# Patient Record
Sex: MALE | Race: WHITE | Employment: UNEMPLOYED | URBAN - METROPOLITAN AREA
[De-identification: names, ages, dates, MRNs, and addresses within clinical notes are randomized per-mention and may not be internally consistent; named-entity substitution may affect disease eponyms.]

---

## 2021-03-12 NOTE — PROGRESS NOTES
3/15/2021  Patient Name: Adalberto Novak  : 1970  Medical Record: <[id number]>      ASSESSMENT AND PLAN    Assessment/Plan:      ASSESSMENT:    1. Multiple joint pain    2. Rheumatoid factor positive        PLAN:     Chaya Pan was seen today for establish care. Diagnoses and all orders for this visit:    Multiple joint pain  -     Miscellaneous Sendout 1; Future  -     Rheumatoid Factor; Future  -     Sedimentation Rate; Future  -     C-Reactive Protein; Future  -     Cyclic Citrul Peptide Antibody, IgG; Future  -     TSH WITH REFLEX TO FT4; Future  -     XR HAND RIGHT (MIN 3 VIEWS); Future  -     XR HAND LEFT (MIN 3 VIEWS); Future  -     XR SHOULDER RIGHT (MIN 2 VIEWS); Future  -     XR SHOULDER LEFT (MIN 2 VIEWS); Future  -     XR KNEE RIGHT (3 VIEWS); Future  -     XR KNEE LEFT (3 VIEWS); Future  -     CBC Auto Differential; Future  -     Comprehensive Metabolic Panel; Future  -     naproxen (NAPROSYN) 500 MG tablet; Take 1 tablet by mouth 2 times daily (with meals)    Rheumatoid factor positive  -     Electrophoresis Protein, Serum without Reflex to Immunofixation; Future  -     Anti SSA; Future  -     Anti SSB; Future  -     Hepatitis B Core Antibody, IgM; Future  -     Hepatitis B Surface Antigen; Future  -     Hepatitis C Antibody; Future    Most likely osteoarthritis. I do not appreciate any synovitis on joint exam.  Slightly elevated rheumatoid factor [15.2] can be seen in 3 to 25 % of normal population without any rheumatoid arthritis. Positive hepatitis C can also cause elevated rheumatoid factor.   However I cannot completely rule out the possibility of rheumatoid arthritis since he has been on prednisone for past 2 weeks which could be masking inflammation    I will do additional work-up to completely rule out rheumatoid arthritis/systemic rheumatic disease   I will also do additional work-up to completely rule out conditions associated with elevated rheumatoid factor [hepatitis panel, SPEP, ABI, SSA/SSB]  I will obtain bilateral hand, knee, shoulder x-rays  I will start naproxen 500 mg twice a day in the meantime  Further management pending test results    The patient was advised that NSAID-type medications have two very important potential side effects: gastrointestinal irritation including hemorrhage and renal injuries. He was asked to take the medication with food and to stop if he experiences any GI upset. I asked him to call for vomiting, abdominal pain or black/bloody stools. The patient expresses understanding of these issues and questions were answered. The patient indicates understanding of these issues and agrees with the plan. Return in about 3 weeks (around 4/5/2021). The risks and benefits of my recommendations, as well as other treatment options, benefits and side effects werediscussed with the patient. All questions were answered. I reviewed patient's history, referral documents and electronic medical records  Copy of consult note is being routedelectronically/faxed to referring physician         MEDICATIONS  Current Outpatient Medications   Medication Sig Dispense Refill    predniSONE (DELTASONE) 10 MG tablet       naproxen (NAPROSYN) 500 MG tablet Take 1 tablet by mouth 2 times daily (with meals) 60 tablet 1    albuterol sulfate  (90 BASE) MCG/ACT inhaler Inhale 2 puffs into the lungs every 6 hours as needed for Wheezing 1 Inhaler 0     No current facility-administered medications for this visit. ALLERGIES  Allergies   Allergen Reactions    Morphine     Pcn [Penicillins]     Ultram [Tramadol]          Comments  No specialty comments available. REFERRING PHYSICIAN:Son Resendiz PA-C    HISTORY OF PRESENT ILLNESS  Karyna Gibson is a 52 y.o. male with past medical history of asthma, COPD who is being seen for follow up evaluation of  joint pain. His symptoms started 1-1/2-year ago and are progressively getting worse.   He is complaining of pain in all the joints. Symptoms are worse in his shoulders and knees. He has been on daily basis without any significant living or aggravating factors. He denies any swelling in the joints. He has a morning stiffness lasting for 15 to 20 minutes. He has difficulty opening jars. He also has difficulty going up and down the steps and standing from sitting position. Joints crack and pop. He was given prednisone course 3 times with some relief. He has also tried NSAIDs [ibuprofen, over-the-counter Aleve and Advil] with some relief. He denies psoriasis, inflammatory bowel disease, inflammatory back pain, dactylitis, enthesitis, tenosynovitis or uveitis. He denies any family history of rheumatoid arthritis. He denies fever, weight loss or swollen glands. He had a work-up by PCP that showed slightly elevated rheumatoid factor 15.2, normal ESR and CRP and ABI. Uric acid normal.    HPI  Review of Systems    REVIEW OF SYSTEMS: Positive for dry mouth, chest pain  Constitutional: No unanticipated weight loss or fevers. No fatigue and malaise. Integumentary: No rash, photosensitivity, malar rash, livedo reticularis, alopecia and Raynaud's symptoms, sclerodactyly, skin tightening  Eyes: negative for visual disturbance and persistent redness, discharge from eyes   ENT: - No tinnitus, loss of hearing, vertigo, or recurrent ear infections.  - No history of nasal/oral ulcers. - No history of dry eyes  Cardiovascular: No history of pericarditis,  murmur or palpitations  Respiratory: No shortness of breath, cough or history of interstitial lung disease. No history of pleurisy. No history of tuberculosis or atypical infections. Gastrointestinal: No history of heart burn, dysphagia or esophageal dysmotility. No change in bowel habits or any inflammatory bowel disease. Genitourinary: No history renal disease, miscarriages. Hematologic/Lymphatic: No abnormal bruising or bleeding, blood clots or swollen lymph nodes.   Neurological: No history of headaches, seizure or focal weakness. No history of neuropathies, paresthesias or hyperesthesias, facial droop, diplopia  Psychiatric: No history of bipolar disease, anxiety, depression  Endocrine: Denies any polyuria, polydipsia and osteoporosis  Allergic/Immunologic: No nasal congestion or hives. I have reviewed patients Past medical History, Social History and Family History as mentioned in her chart and this remains unchanged fromprevious.     Past Medical History:   Diagnosis Date    Asthma     COPD [facility]chronic obstructive pulmonary disease) ([facility])      Past Surgical History:   Procedure Laterality Date    TIBIA FRACTURE SURGERY      bilateral    WRIST SURGERY       Social History     Socioeconomic History    Marital status: Single     Spouse name: Not on file    Number of children: Not on file    Years of education: Not on file    Highest education level: Not on file   Occupational History    Not on file   Social Needs    Financial resource strain: Not on file    Food insecurity     Worry: Not on file     Inability: Not on file    Transportation needs     Medical: Not on file     Non-medical: Not on file   Tobacco Use    Smoking status: Former Smoker     Types: Cigarettes   Substance and Sexual Activity    Alcohol use: Yes     Comment: occ    Drug use: No    Sexual activity: Not on file   Lifestyle    Physical activity     Days per week: Not on file     Minutes per session: Not on file    Stress: Not on file   Relationships    Social connections     Talks on phone: Not on file     Gets together: Not on file     Attends Evangelical service: Not on file     Active member of club or organization: Not on file     Attends meetings of clubs or organizations: Not on file     Relationship status: Not on file    Intimate partner violence     Fear of current or ex partner: Not on file     Emotionally abused: Not on file     Physically abused: Not on file     Forced sexual activity: Not on file   Other Topics Concern    Not on file   Social History Narrative    Not on file     Family History   Problem Relation Age of Onset    Arthritis Maternal Uncle          PHYSICAL EXAM   Vitals:    03/15/21 0915   BP: 133/86   Pulse: 71   SpO2: 98%   Weight: 169 lb 6.4 oz (76.8 kg)   Height: 5' 10\" (1.778 m)     Physical Exam  Constitutional:  Well developed, well nourished, no acute distress, non-toxic appearance   Musculoskeletal:    RIGHT  Swell  Tender  ROM  LEFT  Swell  Tender  ROM    DIP2  0  0   Heberden  0  0   Heberden   DIP3  0  0   Heberden  0  0   Heberden   DIP4  0  0   Heberden  0  0   Heberden   DIP5  0  0   Heberden  0  0   [place]berden   PIP1  0  0  FULL   0  0  FULL    PIP2  0  0  FULL   0  0  FULL    PIP3  0  0  FULL   0  0  FULL    PIP4  0  0  FULL   0  0  FULL    PIP5  0  0  FULL   0  0  FULL    MCP1  0  0  FULL   0  0  FULL    MCP2  0  0  FULL   0  0  FULL    MCP3  0  0  FULL   0  0  FULL    MCP4  0  0  FULL   0  0  FULL    MCP5  0  0  FULL   0  0  FULL    Wrist  0  0  FULL   0  0  FULL    Elbow  0  0  FULL   0  0  FULL    Shouldr  0  0   crepitus  0  0   crepitus   Hip  0  0  FULL   0  0  FULL    Knee  0  0   crepitus  0  0   crepitus   Ankle  0  0  FULL   0  0  FULL    MTP1  0  0  FULL   0  0  FULL    MTP2  0  0  FULL   0  0  FULL    MTP3  0  0  FULL   0  0  FULL    MTP4  0  0  FULL   0  0  FULL    MTP5  0  0  FULL   0  0  FULL    IP1  0  0  FULL   0  0  FULL    IP2  0  0  FULL   0  0  FULL    IP3  0  0  FULL   0  0  FULL    IP4  0  0  FULL   0  0  FULL    IP5  0  0  FULL   0 0 FULL       Ambulates without assistance, normal gait  Neck: Full ROM, no tenderness,supple   Back- no tenderness. Eyes:  PERRL, extra ocular movements intact, conjunctiva normal   HEENT:  Atraumatic, normocephalic, external ears normal, oropharynx moist, no pharyngeal exudates.    Respiratory:  No respiratory distress  GI:  Soft, nondistended, normal bowel sounds, nontender, noorganomegaly, no mass, no rebound, no guarding   :  No costovertebral angle tenderness   Integument:  Well hydrated, no rash or telangiectasias  Lymphatic:  No lymphadenopathy noted   Neurologic:   Alert & oriented x 3, CN 2-12 normal, no focal deficits noted. Sensations Intact. Muscle strength 5/5 proximallyand distally in upper and lower extremities. Psychiatric:  Speech and behavior appropriate           LABS AND IMAGING  Outside data reviewed and in HPI    No results found for: WBC, RBC, HGB, HCT, PLT, MCV, MCH, MCHC, RDW, NRBC, SEGSPCT, BANDSPCT, BLASTSPCT, METASPCT, LYMPHOPCT, PROMYELOPCT, MONOPCT, MYELOPCT, EOSPCT, BASOPCT, MONOSABS, LYMPHSABS, EOSABS, BASOSABS, DIFFTYPE    Chemistry    No results found for: NA, K, CL, CO2, BUN, CREATININE No results found for: CALCIUM, ALKPHOS, AST, ALT, BILITOT       No results found for: SEDRATE  No results found for: CRP  No results found for: ABI, MARY, SSA, SSB, C3, C4  No results found for: RF, CCPABIGG  No results found for: ABI, ANATITER, ANAINT, PATH  No results found for: DSDNAG, DSDNAIGGIFA  No results found for: SSAROAB, SSALAAB  No results found for: SMAB, RNPAB  No results found for: CENTABIGG  No results found for: C3, C4, ACE  No results found for: Dedra Shames, [id number]  No results found for: [employer]  No results found for: [id number]  No results found for: TSHFT4, TSH  No results found for: VITD25    Labs reviewed 2/28/2020  ABI direct negative  RF 15.2 [H]  ESR 2  CRP less than 1  Uric acid 4.7  ######################################################################    I thank you for giving me theopportunity to participate in [facility]. If you have any questions or concerns please feel free to contact me. I look forward to following  Ming Doshi along with you. Electronically signed by: Lee Ann Leiva MD, 3/15/2021 9:57 AM    Documentation was done using voice recognition dragon software.   Every effort was made to ensure accuracy;however, inadvertent unintentional computerized transcription errors may be present.

## 2021-03-15 ENCOUNTER — OFFICE VISIT (OUTPATIENT)
Dept: RHEUMATOLOGY | Age: 51
End: 2021-03-15
Payer: COMMERCIAL

## 2021-03-15 VITALS
SYSTOLIC BLOOD PRESSURE: 133 MMHG | HEART RATE: 71 BPM | WEIGHT: 169.4 LBS | OXYGEN SATURATION: 98 % | DIASTOLIC BLOOD PRESSURE: 86 MMHG | BODY MASS INDEX: 24.25 KG/M2 | HEIGHT: 70 IN

## 2021-03-15 DIAGNOSIS — R76.8 RHEUMATOID FACTOR POSITIVE: ICD-10-CM

## 2021-03-15 DIAGNOSIS — M25.50 MULTIPLE JOINT PAIN: Primary | ICD-10-CM

## 2021-03-15 DIAGNOSIS — M25.50 MULTIPLE JOINT PAIN: ICD-10-CM

## 2021-03-15 LAB
A/G RATIO: 2 (ref 1.1–2.2)
ALBUMIN SERPL-MCNC: 4.7 G/DL (ref 3.4–5)
ALP BLD-CCNC: 68 U/L (ref 40–129)
ALT SERPL-CCNC: 24 U/L (ref 10–40)
ANION GAP SERPL CALCULATED.3IONS-SCNC: 12 MMOL/L (ref 3–16)
AST SERPL-CCNC: 38 U/L (ref 15–37)
BASOPHILS ABSOLUTE: 0 K/UL (ref 0–0.2)
BASOPHILS RELATIVE PERCENT: 0.5 %
BILIRUB SERPL-MCNC: 0.9 MG/DL (ref 0–1)
BUN BLDV-MCNC: 19 MG/DL (ref 7–20)
C-REACTIVE PROTEIN: <3 MG/L (ref 0–5.1)
CALCIUM SERPL-MCNC: 9.7 MG/DL (ref 8.3–10.6)
CHLORIDE BLD-SCNC: 101 MMOL/L (ref 99–110)
CO2: 29 MMOL/L (ref 21–32)
CREAT SERPL-MCNC: 1.2 MG/DL (ref 0.9–1.3)
EOSINOPHILS ABSOLUTE: 0.2 K/UL (ref 0–0.6)
EOSINOPHILS RELATIVE PERCENT: 2 %
GFR AFRICAN AMERICAN: >60
GFR NON-AFRICAN AMERICAN: >60
GLOBULIN: 2.4 G/DL
GLUCOSE BLD-MCNC: 99 MG/DL (ref 70–99)
HCT VFR BLD CALC: 44.8 % (ref 40.5–52.5)
HEMOGLOBIN: 15.5 G/DL (ref 13.5–17.5)
LYMPHOCYTES ABSOLUTE: 1.6 K/UL (ref 1–5.1)
LYMPHOCYTES RELATIVE PERCENT: 21.4 %
MCH RBC QN AUTO: 33.6 PG (ref 26–34)
MCHC RBC AUTO-ENTMCNC: 34.7 G/DL (ref 31–36)
MCV RBC AUTO: 96.9 FL (ref 80–100)
MONOCYTES ABSOLUTE: 0.7 K/UL (ref 0–1.3)
MONOCYTES RELATIVE PERCENT: 8.5 %
NEUTROPHILS ABSOLUTE: 5.2 K/UL (ref 1.7–7.7)
NEUTROPHILS RELATIVE PERCENT: 67.6 %
PDW BLD-RTO: 13.3 % (ref 12.4–15.4)
PLATELET # BLD: 237 K/UL (ref 135–450)
PMV BLD AUTO: 9.9 FL (ref 5–10.5)
POTASSIUM SERPL-SCNC: 3.8 MMOL/L (ref 3.5–5.1)
RBC # BLD: 4.62 M/UL (ref 4.2–5.9)
RHEUMATOID FACTOR: 16 IU/ML
SEDIMENTATION RATE, ERYTHROCYTE: 5 MM/HR (ref 0–15)
SODIUM BLD-SCNC: 142 MMOL/L (ref 136–145)
T4 FREE: 1.5 NG/DL (ref 0.9–1.8)
TOTAL PROTEIN: 7.1 G/DL (ref 6.4–8.2)
TSH REFLEX FT4: 5.28 UIU/ML (ref 0.27–4.2)
WBC # BLD: 7.7 K/UL (ref 4–11)

## 2021-03-15 PROCEDURE — G8427 DOCREV CUR MEDS BY ELIG CLIN: HCPCS | Performed by: INTERNAL MEDICINE

## 2021-03-15 PROCEDURE — 99204 OFFICE O/P NEW MOD 45 MIN: CPT | Performed by: INTERNAL MEDICINE

## 2021-03-15 PROCEDURE — G8420 CALC BMI NORM PARAMETERS: HCPCS | Performed by: INTERNAL MEDICINE

## 2021-03-15 PROCEDURE — G8484 FLU IMMUNIZE NO ADMIN: HCPCS | Performed by: INTERNAL MEDICINE

## 2021-03-15 RX ORDER — PREDNISONE 10 MG/1
TABLET ORAL
COMMUNITY
Start: 2021-02-23 | End: 2021-04-06

## 2021-03-15 RX ORDER — IBUPROFEN 600 MG/1
TABLET ORAL
COMMUNITY
Start: 2021-01-24 | End: 2021-03-15

## 2021-03-15 RX ORDER — NAPROXEN 500 MG/1
500 TABLET ORAL 2 TIMES DAILY WITH MEALS
Qty: 60 TABLET | Refills: 1 | Status: SHIPPED | OUTPATIENT
Start: 2021-03-15 | End: 2021-03-18

## 2021-03-16 ENCOUNTER — HOSPITAL ENCOUNTER (OUTPATIENT)
Dept: GENERAL RADIOLOGY | Age: 51
Discharge: HOME | End: 2021-03-16
Payer: COMMERCIAL

## 2021-03-16 ENCOUNTER — HOSPITAL ENCOUNTER (OUTPATIENT)
Age: 51
Discharge: HOME | End: 2021-03-16
Payer: COMMERCIAL

## 2021-03-16 DIAGNOSIS — M25.50 MULTIPLE JOINT PAIN: ICD-10-CM

## 2021-03-16 LAB
ANTI-SS-A IGG: <0.2 AI (ref 0–0.9)
ANTI-SS-B IGG: <0.2 AI (ref 0–0.9)
CYCLIC CITRULLINATED PEPTIDE ANTIBODY IGG: <0.5 U/ML (ref 0–2.9)
HEPATITIS B CORE IGM ANTIBODY: NORMAL
HEPATITIS B SURFACE ANTIGEN INTERPRETATION: NORMAL
HEPATITIS C ANTIBODY INTERPRETATION: REACTIVE

## 2021-03-16 PROCEDURE — 73130 X-RAY EXAM OF HAND: CPT

## 2021-03-16 PROCEDURE — 73562 X-RAY EXAM OF KNEE 3: CPT

## 2021-03-16 PROCEDURE — 73030 X-RAY EXAM OF SHOULDER: CPT

## 2021-03-16 NOTE — RESULT ENCOUNTER NOTE
Please call the patient and let him know that his knee, shoulder and hand x-ray show minimal wear-and-tear arthritis. He should continue same medications. We will discuss in detail at next follow-up appointment.

## 2021-03-17 LAB
ALBUMIN SERPL-MCNC: 4.2 G/DL (ref 3.1–4.9)
ALPHA-1-GLOBULIN: 0.3 G/DL (ref 0.2–0.4)
ALPHA-2-GLOBULIN: 0.7 G/DL (ref 0.4–1.1)
BETA GLOBULIN: 1.1 G/DL (ref 0.9–1.6)
GAMMA GLOBULIN: 0.9 G/DL (ref 0.6–1.8)
SPE/IFE INTERPRETATION: NORMAL

## 2021-03-18 ENCOUNTER — TELEPHONE (OUTPATIENT)
Dept: INTERNAL MEDICINE CLINIC | Age: 51
End: 2021-03-18

## 2021-03-18 DIAGNOSIS — R76.8 HEPATITIS C ANTIBODY POSITIVE IN BLOOD: Primary | ICD-10-CM

## 2021-03-18 LAB
ANTINUCLEAR AB INTERPRETIVE COMMENT: NORMAL
ANTINUCLEAR ANTIBODY, HEP-2, IGG: NORMAL

## 2021-03-18 RX ORDER — MELOXICAM 15 MG/1
15 TABLET ORAL DAILY
Qty: 30 TABLET | Refills: 1 | Status: SHIPPED | OUTPATIENT
Start: 2021-03-18 | End: 2021-08-27

## 2021-03-18 NOTE — TELEPHONE ENCOUNTER
Please call the patient and let him know that I will discontinue naproxen and start meloxicam 15 mg daily. I will send a new prescription to the pharmacy.

## 2021-03-18 NOTE — TELEPHONE ENCOUNTER
Patient states he has right shoulder pain. He is requesting something for pain. He states the naproxen helped at first but now it isn't and it makes him drowsy. Patient uses [address] [facility] on [address] in [place].

## 2021-03-18 NOTE — TELEPHONE ENCOUNTER
Called spoke with patient is aware to stop taking the naproxen and start taking the meloxicam. Is aware called into his [facility]  On [address]

## 2021-04-05 NOTE — PROGRESS NOTES
2021   Ascencion Dawkins, was evaluated through a synchronous (real-time) audio-video encounter. The patient (or guardian if applicable) is aware that this is a billable service. Verbal consent to proceed has been obtained within the past 12 months. The visit was conducted pursuant to the emergency declaration under the [facility], [address] [place] [address] and the [employer] General Act. Patient identification was verified, and a caregiver was present when appropriate. The patient was located in a state where the provider was credentialed to provide care. Total time spent for this encounter: Not billed by time    --Baldemar Lopez MD on 2021 at 2:51 PM    An electronic signature was used to authenticate this note. Patient Name: Ascencion Dawkins  : 1970  Medical Record: [id number]      ASSESSMENT AND PLAN    Assessment/Plan:      ASSESSMENT:    1. Primary osteoarthritis involving multiple joints    2. Chronic hepatitis C without hepatic coma ([facility]    3. Rheumatoid factor positive        PLAN:     Dereck Hebert was seen today for follow-up and results. Diagnoses and all orders for this visit:    Primary osteoarthritis involving multiple joints  -     Ambulatory referral to Physical Therapy  -     predniSONE (DELTASONE) 5 MG tablet; Take 3 tabs daily for 10 days,2.5 tabs for 10 days,2 tabs for 10 days,1.5 tab for 10 days,1 tab for 10 days, 0.5 tab for 10 days and stop    Chronic hepatitis C without hepatic coma (HCC)    Rheumatoid factor positive    Most likely osteoarthritis. I do not appreciate any synovitis on joint exam.  Slightly elevated rheumatoid factor [15.2] can be seen in 3 to 25 % of normal population without any rheumatoid arthritis. Positive hepatitis C can also cause elevated rheumatoid factor.    Anti-CCP antibody negative, ABI, SPEP negative, normal ESR and CRP, hand, knee, shoulder x-rays with mild degenerative arthritis    Elevated rheumatoid arthritis most likely due to positive appetite to see    Mild improvement with meloxicam 15 mg daily, will continue  Scheduled to see hepatologist  We will start prednisone taper until seen by hepatologist    The patient indicates understanding of these issues and agrees with the plan. Return in about 4 months (around 8/6/2021). The risks and benefits of my recommendations, as well as other treatment options, benefits and side effects werediscussed with the patient. All questions were answered. MEDICATIONS  Current Outpatient Medications   Medication Sig Dispense Refill    predniSONE (DELTASONE) 5 MG tablet Take 3 tabs daily for 10 days,2.5 tabs for 10 days,2 tabs for 10 days,1.5 tab for 10 days,1 tab for 10 days, 0.5 tab for 10 days and stop 105 tablet 0    meloxicam (MOBIC) 15 MG tablet Take 1 tablet by mouth daily 30 tablet 1    albuterol sulfate  (90 BASE) MCG/ACT inhaler Inhale 2 puffs into the lungs every 6 hours as needed for Wheezing 1 Inhaler 0     No current facility-administered medications for this visit. ALLERGIES  Allergies   Allergen Reactions    Morphine     Pcn [Penicillins]     Ultram [Tramadol]          Comments  No specialty comments available. Background history:  Marina Landin is a 52 y.o. male with past medical history of asthma, COPD who is being seen for follow up evaluation of  joint pain. His symptoms started 1-1/2-year ago and are progressively getting worse. He is complaining of pain in all the joints. Symptoms are worse in his shoulders and knees. He has been on daily basis without any significant living or aggravating factors. He denies any swelling in the joints. He has a morning stiffness lasting for 15 to 20 minutes. He has difficulty opening jars. He also has difficulty going up and down the steps and standing from sitting position. Joints crack and pop.   He was given prednisone course 3 times with some relief. He has also tried NSAIDs [ibuprofen, over-the-counter Aleve and Advil] with some relief. He denies psoriasis, inflammatory bowel disease, inflammatory back pain, dactylitis, enthesitis, tenosynovitis or uveitis. He denies any family history of rheumatoid arthritis. He denies fever, weight loss or swollen glands. He had a work-up by PCP that showed slightly elevated rheumatoid factor 15.2, normal ESR and CRP and ABI. Uric acid normal.    Interim history: He presents for follow-up of osteoarthritis, elevated rheumatoid factor. He continues to complain of pain, stiffness in his shoulders, knees, hands. He denies any swelling in the joints. He has morning stiffness lasting for 15 to 20 minutes. He has difficulty opening jars. He has difficulty going up and down the steps and standing from this sitting position. He was given prednisone with some improvement. He is now on meloxicam 15 mg daily with minimal improvement. He took naproxen which did not help much. Blood work showed positive hepatitis C. Anti-CCP antibody, ESR, CRP, ABI, hepatitis B-.  Hand, shoulder, knee x-rays with mild degenerative changes. He is scheduled to see hepatologist in June 2021    HPI  Review of Systems    REVIEW OF SYSTEMS: Positive for dry mouth, chest pain  Constitutional: No unanticipated weight loss or fevers. No fatigue and malaise. Integumentary: No rash, photosensitivity, malar rash, livedo reticularis, alopecia and Raynaud's symptoms, sclerodactyly, skin tightening  Eyes: negative for visual disturbance and persistent redness, discharge from eyes   ENT: - No tinnitus, loss of hearing, vertigo, or recurrent ear infections.  - No history of nasal/oral ulcers. - No history of dry eyes  Cardiovascular: No history of pericarditis,  murmur or palpitations  Respiratory: No shortness of breath, cough or history of interstitial lung disease. No history of pleurisy.  No history of tuberculosis or atypical infections. Gastrointestinal: No history of heart burn, dysphagia or esophageal dysmotility. No change in bowel habits or any inflammatory bowel disease. Genitourinary: No history renal disease, miscarriages. Hematologic/Lymphatic: No abnormal bruising or bleeding, blood clots or swollen lymph nodes. Neurological: No history of headaches, seizure or focal weakness. No history of neuropathies, paresthesias or hyperesthesias, facial droop, diplopia  Psychiatric: No history of bipolar disease, anxiety, depression  Endocrine: Denies any polyuria, polydipsia and osteoporosis  Allergic/Immunologic: No nasal congestion or hives. I have reviewed patients Past medical History, Social History and Family History as mentioned in her chart and this remains unchanged fromprevious.     Past Medical History:   Diagnosis Date    Asthma     COPD [facility]chronic obstructive pulmonary disease) ([facility])      Past Surgical History:   Procedure Laterality Date    TIBIA FRACTURE SURGERY      bilateral    WRIST SURGERY       Social History     Socioeconomic History    Marital status: Single     Spouse name: Not on file    Number of children: Not on file    Years of education: Not on file    Highest education level: Not on file   Occupational History    Not on file   Social Needs    Financial resource strain: Not on file    Food insecurity     Worry: Not on file     Inability: Not on file    Transportation needs     Medical: Not on file     Non-medical: Not on file   Tobacco Use    Smoking status: Former Smoker     Types: Cigarettes   Substance and Sexual Activity    Alcohol use: Yes     Comment: occ    Drug use: No    Sexual activity: Not on file   Lifestyle    Physical activity     Days per week: Not on file     Minutes per session: Not on file    Stress: Not on file   Relationships    Social connections     Talks on phone: Not on file     Gets together: Not on file     Attends Anglican service: Not on file [] Abnormal-           LABS AND IMAGING  Outside data reviewed and in HPI    Lab Results   Component Value Date    WBC 7.7 03/15/2021    RBC 4.62 03/15/2021    HGB 15.5 03/15/2021    HCT 44.8 03/15/2021     03/15/2021    MCV 96.9 03/15/2021    MCH 33.6 03/15/2021    MCHC 34.7 03/15/2021    RDW 13.3 03/15/2021    LYMPHOPCT 21.4 03/15/2021    MONOPCT 8.5 03/15/2021    BASOPCT 0.5 03/15/2021    MONOSABS 0.7 03/15/2021    LYMPHSABS 1.6 03/15/2021    EOSABS 0.2 03/15/2021    BASOSABS 0.0 03/15/2021       Chemistry        Component Value Date/Time     03/15/2021 1008    K 3.8 03/15/2021 1008     03/15/2021 1008    CO2 29 03/15/2021 1008    BUN 19 03/15/2021 1008    CREATININE 1.2 03/15/2021 1008        Component Value Date/Time    CALCIUM 9.7 03/15/2021 1008    ALKPHOS 68 03/15/2021 1008    AST 38 (H) 03/15/2021 1008    ALT 24 03/15/2021 1008    BILITOT 0.9 03/15/2021 1008          Lab Results   Component Value Date    SEDRATE 5 03/15/2021     Lab Results   Component Value Date    CRP <3.0 03/15/2021     No results found for: ABI, MARY, SSA, SSB, C3, C4  Lab Results   Component Value Date    RF 16.0 03/15/2021     No results found for: ABI, ANATITER, ANAINT, PATH  No results found for: DSDNAG, DSDNAIGGIFA  No results found for: SSAROAB, SSALAAB  No results found for: SMAB, RNPAB  No results found for: CENTABIGG  No results found for: C3, C4, ACE  No results found for: Nottingham Childes, [id number]  No results found for: Anuja Rajas  No results found for: Martha Shove  Lab Results   Component Value Date    TSHFT4 5.28 (H) 03/15/2021     No results found for: VITD25    Labs reviewed 2/28/2020  ABI direct negative  RF 15.2 [H]  ESR 2  CRP less than 1  Uric acid 4.7    Bilateral hand, shoulder, knee x-rays 03/16/2021  Mild degenerative arthritis and knees and bilateral shoulders  ######################################################################    I thank you for giving me theopportunity to participate in [facility]. If you have any questions or concerns please feel free to contact me. I look forward to following  Claudette Min along with you. Electronically signed by: Carina Moore MD, 4/6/2021 2:50 PM    Documentation was done using voice recognition dragon software. Every effort was made to ensure accuracy;however, inadvertent unintentional computerized transcription errors may be present.

## 2021-04-06 ENCOUNTER — VIRTUAL VISIT (OUTPATIENT)
Dept: RHEUMATOLOGY | Age: 51
End: 2021-04-06
Payer: COMMERCIAL

## 2021-04-06 DIAGNOSIS — M15.9 PRIMARY OSTEOARTHRITIS INVOLVING MULTIPLE JOINTS: Primary | ICD-10-CM

## 2021-04-06 DIAGNOSIS — B18.2 CHRONIC HEPATITIS C WITHOUT HEPATIC COMA (HCC): ICD-10-CM

## 2021-04-06 DIAGNOSIS — R76.8 RHEUMATOID FACTOR POSITIVE: ICD-10-CM

## 2021-04-06 PROCEDURE — G8427 DOCREV CUR MEDS BY ELIG CLIN: HCPCS | Performed by: INTERNAL MEDICINE

## 2021-04-06 PROCEDURE — 99214 OFFICE O/P EST MOD 30 MIN: CPT | Performed by: INTERNAL MEDICINE

## 2021-04-06 RX ORDER — PREDNISONE 1 MG/1
TABLET ORAL
Qty: 105 TABLET | Refills: 0 | Status: SHIPPED | OUTPATIENT
Start: 2021-04-06 | End: 2021-08-27

## 2021-04-08 ENCOUNTER — TELEPHONE (OUTPATIENT)
Dept: INTERNAL MEDICINE CLINIC | Age: 51
End: 2021-04-08

## 2021-04-08 NOTE — TELEPHONE ENCOUNTER
Pt called asking if his labs could be faxed to his gastroenterologist.Attn:  Anmol Macdonald Fax # [phone].  Pt asked for call when they are faxed as well

## 2021-08-25 ENCOUNTER — TELEPHONE (OUTPATIENT)
Dept: INTERNAL MEDICINE CLINIC | Age: 51
End: 2021-08-25

## 2021-08-25 NOTE — TELEPHONE ENCOUNTER
Patient called to request an office visit with dr. Dat Flores. Not finding an acceptable appt's. He would like to be seen soon.  Please call patient to scheduled

## 2021-09-13 DIAGNOSIS — M15.9 PRIMARY OSTEOARTHRITIS INVOLVING MULTIPLE JOINTS: ICD-10-CM

## 2021-09-13 RX ORDER — PREDNISONE 1 MG/1
TABLET ORAL
Qty: 105 TABLET | Refills: 0 | OUTPATIENT
Start: 2021-09-13

## 2021-09-13 NOTE — TELEPHONE ENCOUNTER
pt calling requesting refill of predniSONE (DELTASONE) 5 MG tablet     Last written 4/6/21  Last OV 4/6/21  Next OV 12/27/21    Please send to     [address], [address]   [address] [place] [address], [id number] [facility],[phone] [id number]

## 2022-01-06 ENCOUNTER — OFFICE VISIT (OUTPATIENT)
Dept: RHEUMATOLOGY | Age: 52
End: 2022-01-06
Payer: COMMERCIAL

## 2022-01-06 VITALS
BODY MASS INDEX: 23.45 KG/M2 | SYSTOLIC BLOOD PRESSURE: 116 MMHG | DIASTOLIC BLOOD PRESSURE: 71 MMHG | HEART RATE: 66 BPM | WEIGHT: 163.4 LBS

## 2022-01-06 DIAGNOSIS — M15.9 PRIMARY OSTEOARTHRITIS INVOLVING MULTIPLE JOINTS: Primary | ICD-10-CM

## 2022-01-06 DIAGNOSIS — M54.9 MID BACK PAIN: ICD-10-CM

## 2022-01-06 DIAGNOSIS — R76.8 RHEUMATOID FACTOR POSITIVE: ICD-10-CM

## 2022-01-06 DIAGNOSIS — B18.2 CHRONIC HEPATITIS C WITHOUT HEPATIC COMA (HCC): ICD-10-CM

## 2022-01-06 PROCEDURE — G8427 DOCREV CUR MEDS BY ELIG CLIN: HCPCS | Performed by: INTERNAL MEDICINE

## 2022-01-06 PROCEDURE — G8420 CALC BMI NORM PARAMETERS: HCPCS | Performed by: INTERNAL MEDICINE

## 2022-01-06 PROCEDURE — G8484 FLU IMMUNIZE NO ADMIN: HCPCS | Performed by: INTERNAL MEDICINE

## 2022-01-06 PROCEDURE — 1036F TOBACCO NON-USER: CPT | Performed by: INTERNAL MEDICINE

## 2022-01-06 PROCEDURE — 99214 OFFICE O/P EST MOD 30 MIN: CPT | Performed by: INTERNAL MEDICINE

## 2022-01-06 RX ORDER — IBUPROFEN 800 MG/1
TABLET ORAL
COMMUNITY
Start: 2021-11-22 | End: 2022-03-01

## 2022-01-06 RX ORDER — CYCLOBENZAPRINE HCL 5 MG
TABLET ORAL
Qty: 90 TABLET | Refills: 2 | Status: SHIPPED | OUTPATIENT
Start: 2022-01-06

## 2022-01-06 RX ORDER — FAMOTIDINE 40 MG/1
TABLET, FILM COATED ORAL
COMMUNITY
Start: 2021-12-26

## 2022-01-06 RX ORDER — LORATADINE 10 MG/1
TABLET ORAL
COMMUNITY
Start: 2021-10-16

## 2022-01-06 RX ORDER — DILTIAZEM HYDROCHLORIDE 60 MG/1
TABLET, FILM COATED ORAL
COMMUNITY
Start: 2021-11-17

## 2022-01-06 RX ORDER — MONTELUKAST SODIUM 10 MG/1
TABLET ORAL
COMMUNITY
Start: 2021-11-17

## 2022-01-06 NOTE — PROGRESS NOTES
Patient Name: Delores Jamison  : 1970  Medical Record: [id number]      ASSESSMENT AND PLAN    Assessment/Plan:      ASSESSMENT:    1. Primary osteoarthritis involving multiple joints    2. Chronic hepatitis C without hepatic coma ([facility] [address]    3. Rheumatoid factor positive    4. Mid back pain        PLAN:     Austin Reed was seen today for follow-up. Diagnoses and all orders for this visit:    Primary osteoarthritis involving multiple joints  -     CBC Auto Differential; Future  -     Comprehensive Metabolic Panel; Future    Chronic hepatitis C without hepatic coma (HCC)    Rheumatoid factor positive    Mid back pain  -     Ambulatory referral to Physical Therapy  -     cyclobenzaprine (FLEXERIL) 5 MG tablet; Take 1 or 2 tabs at night. Can take 1 tab in am if tolerated    Most likely osteoarthritis. I do not appreciate any synovitis on joint exam.  Slightly elevated rheumatoid factor [15.2] can be seen in 3 to 25 % of normal population without any rheumatoid arthritis. Positive hepatitis C can also cause elevated rheumatoid factor. Anti-CCP antibody negative, ABI, SPEP negative, normal ESR and CRP, hand, knee, shoulder x-rays with mild degenerative arthritis  Advised to continue ibuprofen as needed    Mid back pain-thoracic back pain without any warning signs or radiculopathy. No history of trauma. Most likely mechanical pain. I will obtain thoracic spine x-ray records  I will refer to PT  Start Flexeril as needed  Ibuprofen as needed  If no improvement MRI of the thoracic spine for further evaluation    Hepatitis C-status post antiviral treatment. Follow GI    The patient indicates understanding of these issues and agrees with the plan. Return in about 6 months (around 2022). The risks and benefits of my recommendations, as well as other treatment options, benefits and side effects werediscussed with the patient. All questions were answered.        MEDICATIONS  Current Outpatient Medications   Medication Sig Dispense Refill    SYMBICORT 80-4.5 MCG/ACT AERO       famotidine (PEPCID) 40 MG tablet       ibuprofen (ADVIL;MOTRIN) 800 MG tablet       loratadine (CLARITIN) 10 MG tablet       metoprolol tartrate (LOPRESSOR) 25 MG tablet       montelukast (SINGULAIR) 10 MG tablet       cyclobenzaprine (FLEXERIL) 5 MG tablet Take 1 or 2 tabs at night. Can take 1 tab in am if tolerated 90 tablet 2    albuterol sulfate  (90 BASE) MCG/ACT inhaler Inhale 2 puffs into the lungs every 6 hours as needed for Wheezing 1 Inhaler 0     No current facility-administered medications for this visit. ALLERGIES  Allergies   Allergen Reactions    Morphine     Pcn [Penicillins]     Ultram [Tramadol]          Comments  No specialty comments available. Background history:  Kasie Sandoval is a 54 y.o. male with past medical history of asthma, COPD who is being seen for follow up evaluation of  joint pain. His symptoms started 1-1/2-year ago and are progressively getting worse. He is complaining of pain in all the joints. Symptoms are worse in his shoulders and knees. He has been on daily basis without any significant living or aggravating factors. He denies any swelling in the joints. He has a morning stiffness lasting for 15 to 20 minutes. He has difficulty opening jars. He also has difficulty going up and down the steps and standing from sitting position. Joints crack and pop. He was given prednisone course 3 times with some relief. He has also tried NSAIDs [ibuprofen, over-the-counter Aleve and Advil] with some relief. He denies psoriasis, inflammatory bowel disease, inflammatory back pain, dactylitis, enthesitis, tenosynovitis or uveitis. He denies any family history of rheumatoid arthritis. He denies fever, weight loss or swollen glands. He had a work-up by PCP that showed slightly elevated rheumatoid factor 15.2, normal ESR and CRP and ABI.   Uric acid normal.    Interim history: He presents for follow-up of osteoarthritis, elevated rheumatoid factor. He is noncompliant with follow-ups. He continues to complain of pain, stiffness in his shoulders. He denies any swelling in the joints. He has morning stiffness lasting for 15 to 20 minutes. He has difficulty opening jars. He has difficulty going up and down the steps and standing from this sitting position. He is off of meloxicam.  He takes ibuprofen as needed   He also has mid back pain for past several months. He denies any radiation, tingling or numbness, weakness, bowel or bladder dysfunction or saddle anesthesia. He had thoracic spine x-ray at [facility], records are not available. He was treated with antiviral treatment for hepatitis C. Anti-CCP antibody, ESR, CRP, ABI, hepatitis B-.  Hand, shoulder, knee x-rays with mild degenerative changes. He is scheduled to see hepatologist in June 2021    HPI  Review of Systems    REVIEW OF SYSTEMS: Positive for dry mouth, chest pain  Constitutional: No unanticipated weight loss or fevers. No fatigue and malaise. Integumentary: No rash, photosensitivity, malar rash, livedo reticularis, alopecia and Raynaud's symptoms, sclerodactyly, skin tightening  Eyes: negative for visual disturbance and persistent redness, discharge from eyes   ENT: - No tinnitus, loss of hearing, vertigo, or recurrent ear infections.  - No history of nasal/oral ulcers. - No history of dry eyes  Cardiovascular: No history of pericarditis,  murmur or palpitations  Respiratory: No shortness of breath, cough or history of interstitial lung disease. No history of pleurisy. No history of tuberculosis or atypical infections. Gastrointestinal: No history of heart burn, dysphagia or esophageal dysmotility. No change in bowel habits or any inflammatory bowel disease. Genitourinary: No history renal disease, miscarriages.   Hematologic/Lymphatic: No abnormal bruising or bleeding, blood clots or swollen lymph nodes.  Neurological: No history of headaches, seizure or focal weakness. No history of neuropathies, paresthesias or hyperesthesias, facial droop, diplopia  Psychiatric: No history of bipolar disease, anxiety, depression  Endocrine: Denies any polyuria, polydipsia and osteoporosis  Allergic/Immunologic: No nasal congestion or hives. I have reviewed patients Past medical History, Social History and Family History as mentioned in her chart and this remains unchanged fromprevious. Past Medical History:   Diagnosis Date    Asthma     COPD (chronic obstructive pulmonary disease) ([facility])      Past Surgical History:   Procedure Laterality Date    TIBIA FRACTURE SURGERY      bilateral    WRIST SURGERY       Social History     Socioeconomic History    Marital status: Single     Spouse name: Not on file    Number of children: Not on file    Years of education: Not on file    Highest education level: Not on file   Occupational History    Not on file   Tobacco Use    Smoking status: Former Smoker     Types: Cigarettes    Smokeless tobacco: Never Used   Vaping Use    Vaping Use: Never used   Substance and Sexual Activity    Alcohol use: Yes     Comment: occ    Drug use: No    Sexual activity: Not on file   Other Topics Concern    Not on file   Social History Narrative    Not on file     Social Determinants of Health     Financial Resource Strain:     Difficulty of Paying Living Expenses: Not on file   Food Insecurity:     Worried About [address] [employer] [employer] in the Last Year: Not on file    [address] in the Last Year: Not on file   Transportation Needs:     Lack of Transportation (Medical): Not on file    Lack of Transportation (Non-Medical):  Not on file   Physical Activity:     Days of Exercise per Week: Not on file    Minutes of Exercise per Session: Not on file   Stress:     Feeling of Stress : Not on file   Social Connections:     Frequency of Communication with Friends and Family: Not on file    Frequency of Social Gatherings with Friends and Family: Not on file    Attends Yazidi Services: Not on file    Active Member of Clubs or Organizations: Not on file    Attends Club or Organization Meetings: Not on file    Marital Status: Not on file   Intimate Partner Violence:     Fear of Current or Ex-Partner: Not on file    Emotionally Abused: Not on file    Physically Abused: Not on file    Sexually Abused: Not on file   Housing Stability:     Unable to Pay for Housing in the Last Year: Not on file    Number of Places Lived in the Last Year: Not on file    Unstable Housing in the Last Year: Not on file     Family History   Problem Relation Age of Onset    Arthritis Maternal Uncle        PHYSICAL EXAMINATION:    Vitals:    01/06/22 1352   BP: 116/71   Pulse: 66   Weight: 163 lb 6.4 oz (74.1 kg)       Physical Exam  Constitutional:  Well developed, well nourished, no acute distress, non-toxic appearance   Musculoskeletal:    RIGHT  Swell  Tender  ROM  LEFT  Swell  Tender  ROM    DIP2  0  0   [place]  0  0   [place]   DIP3  0  0   [place]  0  0   [place]   DIP4  0  0   [place]  0  0   [place]   DIP5  0  0   [place]  0  0   [place]   PIP1  0  0  FULL   0  0  FULL    PIP2  0  0  FULL   0  0  FULL    PIP3  0  0  FULL   0  0  FULL    PIP4  0  0  FULL   0  0  FULL    PIP5  0  0  FULL   0  0  FULL    MCP1  0  0  FULL   0  0  FULL    MCP2  0  0  FULL   0  0  FULL    MCP3  0  0  FULL   0  0  FULL    MCP4  0  0  FULL   0  0  FULL    MCP5  0  0  FULL   0  0  FULL    Wrist  0  0  FULL   0  0  FULL    Elbow  0  0  FULL   0  0  FULL    Shouldr  0  0   crepitus  0  0   crepitus   Hip  0  0  FULL   0  0  FULL    Knee  0  0   crepitus  0  0   crepitus   Ankle  0  0  FULL   0  0  FULL    MTP1  0  0  FULL   0  0  FULL    MTP2  0  0  FULL   0  0  FULL    MTP3  0  0  FULL   0  0  FULL    MTP4  0  0  FULL   0  0  FULL    MTP5  0  0  FULL   0  0  FULL    IP1  0  0  FULL   0  0  FULL    IP2  0  0  FULL 0  0  FULL    IP3  0  0  FULL   0  0  FULL    IP4  0  0  FULL   0  0  FULL    IP5  0  0  FULL   0 0 FULL       Ambulates without assistance, normal gait  Neck: Full ROM, no tenderness,supple   Back-tenderness to palpation thoracic spine +  Eyes:  PERRL, extra ocular movements intact, conjunctiva normal   HEENT:  Atraumatic, normocephalic, external ears normal, oropharynx moist, no pharyngeal exudates. Respiratory:  No respiratory distress  GI:  Soft, nondistended, normal bowel sounds, nontender, noorganomegaly, no mass, no rebound, no guarding   :  No costovertebral angle tenderness   Integument:  Well hydrated, no rash or telangiectasias  Lymphatic:  No lymphadenopathy noted   Neurologic:   Alert & oriented x 3, CN 2-12 normal, no focal deficits noted. Sensations Intact. Muscle strength 5/5 proximallyand distally in upper and lower extremities.    Psychiatric:  Speech and behavior appropriate                 LABS AND IMAGING  Outside data reviewed and in HPI    Lab Results   Component Value Date    WBC 7.7 03/15/2021    RBC 4.62 03/15/2021    HGB 15.5 03/15/2021    HCT 44.8 03/15/2021     03/15/2021    MCV 96.9 03/15/2021    MCH 33.6 03/15/2021    MCHC 34.7 03/15/2021    RDW 13.3 03/15/2021    LYMPHOPCT 21.4 03/15/2021    MONOPCT 8.5 03/15/2021    BASOPCT 0.5 03/15/2021    MONOSABS 0.7 03/15/2021    LYMPHSABS 1.6 03/15/2021    EOSABS 0.2 03/15/2021    BASOSABS 0.0 03/15/2021       Chemistry        Component Value Date/Time     03/15/2021 1008    K 3.8 03/15/2021 1008     03/15/2021 1008    CO2 29 03/15/2021 1008    BUN 19 03/15/2021 1008    CREATININE 1.2 03/15/2021 1008        Component Value Date/Time    CALCIUM 9.7 03/15/2021 1008    ALKPHOS 68 03/15/2021 1008    AST 38 (H) 03/15/2021 1008    ALT 24 03/15/2021 1008    BILITOT 0.9 03/15/2021 1008          Lab Results   Component Value Date    SEDRATE 5 03/15/2021     Lab Results   Component Value Date    CRP <3.0 03/15/2021     No results found for: ABI, MARY, SSA, SSB, C3, C4  Lab Results   Component Value Date    RF 16.0 03/15/2021     No results found for: ABI, ANATITER, ANAINT, PATH  No results found for: DSDNAG, DSDNAIGGIFA  No results found for: SSAROAB, SSALAAB  No results found for: SMAB, RNPAB  No results found for: CENTABIGG  No results found for: C3, C4, ACE  No results found for: Obed Lombard, [id number]  No results found for: Jeanette Shouts  No results found for: [id number]  Lab Results   Component Value Date    TSHFT4 5.28 (H) 03/15/2021     No results found for: VITD25    Labs reviewed 2/28/2020  ABI direct negative  RF 15.2 [H]  ESR 2  CRP less than 1  Uric acid 4.7    Bilateral hand, shoulder, knee x-rays 03/16/2021  Mild degenerative arthritis and knees and bilateral shoulders  ######################################################################    I thank you for giving me theopportunity to participate in Audery Goldberg care. If you have any questions or concerns please feel free to contact me. I look forward to following  Donna Vergara along with you. Electronically signed by: Raiza Jacobson MD, MD, 1/6/2022 2:13 PM    Documentation was done using voice recognition dragon software. Every effort was made to ensure accuracy;however, inadvertent unintentional computerized transcription errors may be present.

## 2022-01-07 DIAGNOSIS — M15.9 PRIMARY OSTEOARTHRITIS INVOLVING MULTIPLE JOINTS: ICD-10-CM

## 2022-01-07 LAB
A/G RATIO: 2.1 (ref 1.1–2.2)
ALBUMIN SERPL-MCNC: 4.6 G/DL (ref 3.4–5)
ALP BLD-CCNC: 71 U/L (ref 40–129)
ALT SERPL-CCNC: 21 U/L (ref 10–40)
ANION GAP SERPL CALCULATED.3IONS-SCNC: 11 MMOL/L (ref 3–16)
AST SERPL-CCNC: 22 U/L (ref 15–37)
BASOPHILS ABSOLUTE: 0 K/UL (ref 0–0.2)
BASOPHILS RELATIVE PERCENT: 0.3 %
BILIRUB SERPL-MCNC: 0.4 MG/DL (ref 0–1)
BUN BLDV-MCNC: 14 MG/DL (ref 7–20)
CALCIUM SERPL-MCNC: 10.2 MG/DL (ref 8.3–10.6)
CHLORIDE BLD-SCNC: 99 MMOL/L (ref 99–110)
CO2: 30 MMOL/L (ref 21–32)
CREAT SERPL-MCNC: 1.1 MG/DL (ref 0.9–1.3)
EOSINOPHILS ABSOLUTE: 0 K/UL (ref 0–0.6)
EOSINOPHILS RELATIVE PERCENT: 0.3 %
GFR AFRICAN AMERICAN: >60
GFR NON-AFRICAN AMERICAN: >60
GLUCOSE BLD-MCNC: 137 MG/DL (ref 70–99)
HCT VFR BLD CALC: 40.8 % (ref 40.5–52.5)
HEMOGLOBIN: 14 G/DL (ref 13.5–17.5)
LYMPHOCYTES ABSOLUTE: 0.8 K/UL (ref 1–5.1)
LYMPHOCYTES RELATIVE PERCENT: 8.8 %
MCH RBC QN AUTO: 33 PG (ref 26–34)
MCHC RBC AUTO-ENTMCNC: 34.5 G/DL (ref 31–36)
MCV RBC AUTO: 95.6 FL (ref 80–100)
MONOCYTES ABSOLUTE: 0.2 K/UL (ref 0–1.3)
MONOCYTES RELATIVE PERCENT: 2.6 %
NEUTROPHILS ABSOLUTE: 7.6 K/UL (ref 1.7–7.7)
NEUTROPHILS RELATIVE PERCENT: 88 %
PDW BLD-RTO: 12.9 % (ref 12.4–15.4)
PLATELET # BLD: 205 K/UL (ref 135–450)
PMV BLD AUTO: 9.7 FL (ref 5–10.5)
POTASSIUM SERPL-SCNC: 4.3 MMOL/L (ref 3.5–5.1)
RBC # BLD: 4.26 M/UL (ref 4.2–5.9)
SODIUM BLD-SCNC: 140 MMOL/L (ref 136–145)
TOTAL PROTEIN: 6.8 G/DL (ref 6.4–8.2)
WBC # BLD: 8.7 K/UL (ref 4–11)

## 2022-01-10 ENCOUNTER — TELEPHONE (OUTPATIENT)
Dept: RHEUMATOLOGY | Age: 52
End: 2022-01-10

## 2022-01-17 ENCOUNTER — HOSPITAL ENCOUNTER (OUTPATIENT)
Dept: PHYSICAL THERAPY | Age: 52
Setting detail: THERAPIES SERIES
Discharge: HOME | End: 2022-01-17
Payer: COMMERCIAL

## 2022-01-17 PROCEDURE — 97140 MANUAL THERAPY 1/> REGIONS: CPT

## 2022-01-17 PROCEDURE — 97530 THERAPEUTIC ACTIVITIES: CPT

## 2022-01-17 PROCEDURE — 97110 THERAPEUTIC EXERCISES: CPT

## 2022-01-17 PROCEDURE — 97162 PT EVAL MOD COMPLEX 30 MIN: CPT

## 2022-01-17 NOTE — PLAN OF CARE
he has been in pain since he was a child but he never got it checked out. He notices joint pain increases if he eats sugar so he tries to watch what he eats. He states issues with back pain on & off thru life but back pain flared up during COVID when he couldn't work out at [email or web address]. It has persisted even though he is now back in the gym. Pain is from waist to back of shoulder blades and he feels his shoulder pain has increased as well especially when lifting overhead. He also complains that his joints all pop and grind and he has difficulty standing fully upright. He hopes to improve his mobility and decrease his pain    Fear avoidance: I should not do physical activities that (might) make my pain worse   [] True   [x] False     Relevant Medical History:  Functional Outcome:   ADIN: raw score = 19; dysfunction = 38%  Quick DASH: raw score=27; dysfunction=36%    Pain Scale:  5-6/10 mid back; 3-4/10 shoulders  Easing factors: [employer] 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 Tries to work out 3x/wk (member of [employer]) for cardio ([employer]) Likes to [employer]. Used to be a . Living Status/Prior Level of Function: Prior to this injury / incident, pt was independent with ADLs and IADLs. Pt is independent. He lives with his mom and he takes care of the house/yard work      OBJECTIVE:     Palpation: TTP and very tight TS paraspinals, some knots and TP's    Functional Mobility/Transfers:  WNL    Posture: R crest high, mod mid TS kyphosis, fwd head,  protracted scaps, ant shoulders     Gait: (include devices/WB status) WNL    Bandages/Dressings/Incisions: NA     Dermatomes Normal Abnormal Comments   Top of head (C1)   ALL APPEAR WNL TODAY.  PATIENT STATES TINGLING IN HANDS HAPPENS ONLY WITH PROLONGED GRIPPING SOMETHING TIGHTLY   Posterior occipital region (C2)      Side of neck (C3)      Top of shoulder (C4)      Lateral deltoid (C5)      Tip of thumb (C6)      Distal middle finger (C7)      Distal fifth finger (C8)      Medial forearm (T1)      inguinal area (L1)       anterior mid-thigh (L2)      distal ant thigh/med knee (L3)      medial lower leg and foot (L4)      lateral lower leg and foot (L5)      posterior calf (S1)      medial calcaneus (S2)          Myotomes Normal Abnormal Comments   Neck flexion (C1-C2) x     Neck sidebending (C3) x     Shoulder elevation (C4) x     Shoulder abduction (C5) x     Elbow flexion/wrist extension (C6) x     Elbow extension/wrist flexion (C7) x     Thumb abduction (C8) x     Finger abduction (T1) x     Hip flexion (L1-L2) x     Knee extension (L2-L4) x     Dorsiflexion (L4-L5) x     Great Toe Ext (L5) x     Ankle Eversion (S1-S2) x     Ankle PF(S1-S2) x         Reflexes Normal Abnormal Comments   C5-6 Biceps      C5-6 Brachioradialis      C7-8 Triceps      Hodges      S1-2 Seated achilles      S1-2 Prone knee bend      L3-4 Patellar tendon      Clonus      Babinski          ROM  Comments   Cervical Flexion WNL    Cervical Extension WNL         Lumbar Flexion WFL * Pain near T8-9  At ~50% of motion flexing and returning to upright   Lumbar Extension 20% Pain in mid back     ROM LEFT RIGHT Comments   Cervical Side Bend WNL WNL    Cervical Rotation [facility]    Shoulder Flex 146 135*    Shoulder Abd WNL WNL    Shoulder ER [facility]     Shoulder IR            Lumbar Side Bend [facility] WFL Pain in left lumbar w/ RSB   Lumbar Rotation 70% 70%  discomfort in spine   Hip Flexion WNL WNL    Hip Abd WNL WNL    Hip ER WNL WNL    Hip IR WNL WNL    Hip Extension WNL WNL    Knee Ext WNL WNL    Knee Flex WNL WNL Hamstring Flex Mild tight Mild tight    Piriformis WFL WFL    Quads Mild tight Mild tight              Strength LEFT RIGHT Comments   Shoulder flexion 4+ 4+    Shoulder scaption 4+ 4+    Shoulder ER 4+ 4    Shoulder IR 4+ 4+    Biceps 5 5    Triceps 5 5    Rhomboid 4 4    Mid trap 4- 4- scaps tested in prone   Low trap 3+ 3+    Lat 4- 4-          Multifidus 4- 4-    Transverse Ab      Hip Flexors 4+ 4+    Hip Abductors 4+ 4+    Hip Extensors 4+ 4+    Hip Internal Rotators      Hip External Rotators        TA Muscle Contraction Scale    Cervical Joint mobility: not tested   []Normal    []Hypo   []Hyper    Thoracic Joint mobility: sig stiffness throughout TS. Mal-alignment in neutral: T3-T6 rotated R, T8-9 rotated L   []Normal    [x]Hypo   []Hyper    Lumbar Joint mobility:    [x]Normal    []Hypo   []Hyper    Sacral Joint mobility: not tested   []Normal    []Hypo   []Hyper      Neural dynamic tension testing Normal Abnormal Comments   ULTT x           Slump Test  - Degree of knee flexion:  x     SLR       0-30 x     30-70 x     Femoral nerve (L2-4) x         Orthopaedic Special Tests  Normal Abnormal NT Comments    Cervical:       Hautard's        Rhomberg       Sharps-Ludin       Cervical Torsion / Body Rotation        C2 Kick       Modified Shear       Compression       Distraction               Lumbar: Toe walk x      Heel walk x      Fwd Bend-aberrant or innominate mvmt       Trendelenburg        Kemps/Quadrant       Stork       BAMBI/Oswaldo       Hip scour       SLR x      Crossed SLR       Supine to sit       Hip thrust       SI distraction/compression       PA/Spring       Prone Instability test       Prone knee bend       Sacral Spring/thrust       Neers impingement  x  R>L                                 [x] Patient history, allergies, meds reviewed. Medical chart reviewed. See intake form.      Review Of Systems (ROS):  [x]Performed Review of systems (Integumentary, CardioPulmonary, Neurological) by intake and observation. Intake form has been scanned into medical record. Patient has been instructed to contact their primary care physician regarding ROS issues if not already being addressed at this time.       Co-morbidities/Complexities (which will affect course of rehabilitation):   []None        [x]Hx of COVID- 7 mos ago   Arthritic conditions   []Rheumatoid arthritis (M05.9)-pt states he has a (+) RA factor due to past Hep C  [x]Osteoarthritis (M19.91)  []Gout   Cardiovascular conditions   []Hypertension (I10)  []Hyperlipidemia (E78.5)  []Angina pectoris (I20)  []Atherosclerosis (I70)  []Pacemaker  []Hx of CABG/stent/  cardiac surgeries  Heart murmur & arrythmia Musculoskeletal conditions   []Disc pathology   []Congenital spine pathologies   []Osteoporosis (M81.8)  []Osteopenia (M85.8)  []Scoliosis       Endocrine conditions   []Hypothyroid (E03.9)  []Hyperthyroid Gastrointestinal conditions   []Constipation (F64.87)   Metabolic conditions   []Morbid obesity (E66.01)  []Diabetes type 1(E10.65) or 2 (E11.65)   []Neuropathy (G60.9)     Cardio/Pulmonary conditions   [x]Asthma (J45)  []Coughing   [x]COPD (J44.9)  []CHF  []A-fib   Psychological Disorders  [x]Anxiety (F41.9)  [x]Depression (F32.9)   []Other:   Developmental Disorders  []Autism (F84.0)  []CP (G80)  []Down Syndrome (Q90.9)  []Developmental delay     Neurological conditions  []Prior Stroke (I69.30)  []Parkinson's (G20)  []Encephalopathy (G93.40)  []MS (G35)  []Post-polio (G14)  []SCI  []TBI  []ALS Other conditions  []Fibromyalgia (M79.7)  []Vertigo  []Syncope  []Kidney Failure  []Cancer      []currently undergoing                treatment  []Pregnancy  []Incontinence   Prior surgeries  []involved limb  []previous spinal surgery  [] section birth  []hysterectomy  []bowel / bladder surgery  [x]other relevant surgeries:  ORIF B tibia    []Other:              Barriers to/and or personal factors that will affect rehab potential:              []Age  []Sex   []Smoker              []Motivation/Lack of Motivation                        [x]Co-Morbidities              []Cognitive Function, education/learning barriers              []Environmental, home barriers              [x]profession/work barriers  []past PT/medical experience  []other:  Justification: pt has joints that easily flare up with certain foods. His job activities sometimes flare his back/shoulder pain    Falls Risk Assessment (30 days):    [x] Falls Risk assessed and no intervention required. [] Falls Risk assessed and Patient requires intervention due to being higher risk   TUG score (>12s at risk):     [] Falls education provided, including         ASSESSMENT: AT EVAL 1/17/22:  Pt is a 54 y.o. man that reports generalized chronic joint pain with primary current complaint of mid back pain and shoulder pain. He exhibits a kyphotic TS with poor segmental mobility and some mal-alignment and protracted scaps. He also has tight pecs and anterior humeral glide position of GHJs R>L with weak scapular stabilizers and multifidi. He has tightness and TP's in TS paraspinals. He has decreased ROM in TS and R shoulder with elevation. He does not exhibit any neural tension signs currently. Pt should benefit from skilled PT to decrease pain and improve mobility.      Functional Impairments:     [x]Noted cervical/thoracic//GHJ/ hypomobility   []Noted cervical/thoracic/lumbosacral and/or generalized hypermobility   [x]Decreased spinal and shoulder ROM   []Noted Headache pain aggravated by neck movements with/without dizziness   []Abnormal reflexes/sensation/myotomal/dermatomal deficits   []Decreased DCF control or ability to hold head up   []Decreased core/proximal hip strength and neuromuscular control    [x]Decreased RC/scapular/core strength and neuromuscular control    []Decreased UE and/or LE functional strength  []Reduced balance/proprioceptive control    []other:      Functional Activity Limitations (from functional questionnaire and intake)   [x]Reduced ability to tolerate prolonged functional positions   [x]Reduced ability or difficulty with changes of positions or transfers between positions   []Reduced ability to maintain good posture and demonstrate good body mechanics with sitting, bending, and lifting   [] Reduced ability or tolerance with driving and/or computer work   [x]Reduced ability to perform lifting, reaching, carrying tasks   []Reduced ability to concentrate   [x]Reduced ability to sleep    []Reduced ability to tolerate any impact through UE or spine   []Reduced ability to ambulate prolonged functional periods/distances/surfaces   []Reduced ability to squat   [x]Reduced ability to forward bend   []Reduced ability to ascend/descend stairs   []other:    Participation Restrictions   []Reduced participation in self care activities   [x]Reduced participation in home management activities   [x]Reduced participation in work activities   [x]Reduced participation in social activities. [x]Reduced participation in sport/recreational activities. Classification/Subgrouping:   []Signs/symptoms consistent with spinal instability/stabilization subgroup. [x]Signs/symptoms consistent with spinal mobilization/manipulation subgroup, myotomes and dermatomes intact. Meets manipulation criteria.     []signs/symptoms consistent with neck pain with mobility deficits     []signs/symptoms consistent with neck pain with movement coordinated impairments    []signs/symptoms consistent with neck pain with radiating pain    []signs/symptoms consistent with neck pain with headaches (cervicogenic)    []Signs/symptoms consistent with nerve root involvement including myotome & dermatome dysfunction   []sign/symptoms consistent with spinal facet dysfunction   []signs/symptoms consistent suggesting central cord compression/UMN syndromes   []Signs/symptoms consistent with Lumbar direction specific/centralization subgroup   []Signs/symptoms consistent with Cervical and/or Lumbar traction subgroup   []signs/symptoms consistent with discogenic cervical pain   []signs/symptoms consistent with rib dysfunction   [x]signs/symptoms consistent with postural dysfunction   []Signs/symptoms consistent with lumbar stenosis type dysfunction   [x]signs/symptoms consistent with shoulder pathology (impingement)    []signs/symptoms consistent with post-surgical status including decreased ROM, strength and function.    []signs/symptoms consistent with pathology which may benefit from Dry Needling   []signs/symptoms which may limit the use of advanced manual therapy techniques: (Hypertension, recent trauma, intolerance to end range positions, prior TIA, visual issues, UE myotomes loss )       Prognosis/Rehab Potential:      []Excellent   [x]Good    []Fair   []Poor    Tolerance of evaluation/treatment:    []Excellent   [x]Good    []Fair   []Poor    Physical Therapy Evaluation Complexity Justification  [x] A history of present problem with:  [] no personal factors and/or comorbidities that impact the plan of care;  []1-2 personal factors and/or comorbidities that impact the plan of care  [x]3 personal factors and/or comorbidities that impact the plan of care  [x] An examination of body systems using standardized tests and measures addressing any of the following: body structures and functions (impairments), activity limitations, and/or participation restrictions;:  [] a total of 1-2 or more elements   [x] a total of 3 or more elements   [] a total of 4 or more elements   [x] A clinical presentation with:  [x] stable and/or uncomplicated characteristics   [] evolving clinical presentation with changing characteristics  [] unstable and unpredictable characteristics;   [x] Clinical decision making of [] low, [x] moderate, [] high complexity using standardized patient assessment instrument and/or measurable assessment of Adjusted    Long Term Goals: To be achieved in: 6 weeks  1. Disability index score of 20% or less for the  ADIN and Quick DASH to assist with reaching prior level of function. [] Progressing: [] Met: [] Not Met: [] Adjusted  2. Patient will demonstrate increased [facility] spine and shoulder elevation without pain arc to allow for pt to bend and stand fully upright and reach overhead without pain in back or shoulders  [] Progressing: [] Met: [] Not Met: [] Adjusted  3. Patient will demonstrate an increase in postural awareness and control and activation of scapular stabilizers to allow for proper functional mobility as indicated by patients Functional Deficits. [] Progressing: [] Met: [] Not Met: [] Adjusted  4. Patient will demonstrate an increase in Strength to grossly 4+/5 for scap stabilizers and deep back muscles to allow for proper functional mobility as indicated by patients Functional Deficits. [] Progressing: [] Met: [] Not Met: [] Adjusted  5. Patient will return to functional activities including lifting weights overhead without increased symptoms or restriction. [] Progressing: [] Met: [] Not Met: [] Adjusted  6. Pt will report at least 50% less back pain during ADLs, job and workouts  [] Progressing: [] Met: [] Not Met: [] Adjusted     Electronically signed by:   Marquita Norman, PT/ DPT

## 2022-01-18 NOTE — FLOWSHEET NOTE
[facility]  Phone: [phone]   Fax: [phone]    Physical Therapy Treatment Note/ Progress Report:     Date:  2022    Patient Name:  Suleiman Mccord    :  1970  MRN: [id number]  Restrictions/Precautions:    Medical/Treatment Diagnosis Information:  · Diagnosis: M54.9 Mid back pain  · Treatment Diagnosis: Mid back pain/ B shoulder pain R>L; decreased ROM/hypomobility/mal-alignment of TS; ant humeral glide position of GHJ; decreased strength of scap stabilizers; tight pecs  Insurance/Certification information:  PT Insurance Information: [employer] ([id number])  Physician Information:  Referring Practitioner: Momo Phelan MD  Plan of care signed (Y/N): []  Yes  []  No     Date of Patient follow up with Physician: 22     Progress Report: []  Yes  []  No     Date Range for reporting period:  Beginnin22  Ending:      Progress report due (10 Rx/or 30 days whichever is less):      Recertification due (POC duration/ or 90 days whichever is less):  3/2/22    Visit # Insurance Allowable Auth required?  Date Range   Eval +  ? 30 [x]  Yes  []  No pcy       Units approved Units used/by date Date Range   tbd          Latex Allergy:  [x]NO      []YES  Preferred Language for Healthcare:   [x]English       []other:    Functional Scale:        Date assessed:  ADIN: raw score=19; dysfunction=38%   22  Quick DASH: raw score = 27; dysfunction = 36%  22     Pain level:  5-6/10 mid back; 3-4/10 shoulders (at times pain levels are higher)     SUBJECTIVE:  See eval    OBJECTIVE: See eval   Observation:    Test measurements:      RESTRICTIONS/PRECAUTIONS:     Exercises/Interventions:   Therapeutic Exercise (82667) x 15' Resistance / level Sets / time  Reps Notes/Cues   UBE       Wall slide W->Y w/ lift off  1/3\" 10 HEP   S/L open book  \" 10 R/L HEP   Seated TS flex/ext (against towel roll mid TS)  \" 10 HEP   Mid row High row       LPD       Supine on half foam roll w/ B GH flexion and HABD                            Therapeutic Activities (76633)                                          Neuromuscular Re-ed (34672)       Prone scap stabs       Q-ped opp UE/LE lift                            Manual Intervention (48285) x 14'       Shld /GH Mobs Gr 3-4 post R-GHJ glides  4'     Post Cap mobs       Thoracic/Rib manipulation Gr 3-4 PA glides TS; Gr 4 rib springs 6'     CT MT/Mobs       STM/ IASTM STM to TS paraspinals 6'                Modalities:     Pt. Education: (TA 15 min)  1/17/22: Educated pt on how posture and strength imbalance is affecting his pain. -pt educated on diagnosis, prognosis and expectations for rehab  -all pt questions were answered    Home Exercise Program:  Patient was instructed in the following exercises, performed them in the clinic and was issued a handout:    Access Code: [email or web address]  URL: [email or web address]  Date: 01/17/2022  Prepared by:  Maira Hendricks  Exercises  Wall slides W to Y position with lift off - 1 x daily - 7 x weekly - 2 sets - 10 reps  Seated Thoracic Flexion and Extension - 1 x daily - 7 x weekly - 1 sets - 10 reps - 5-10 seconds hold  Sidelying Thoracic Rotation with Open Book - 1 x daily - 7 x weekly - 1-2 sets - 10 reps - 5-10 seconds hold       Therapeutic Exercise and NMR EXR  [x] (91582) Provided verbal/tactile cueing for activities related to strengthening, flexibility, endurance, ROM  for improvements in scapular, scapulothoracic and UE control with self care, reaching, carrying, lifting, house/yardwork, driving/computer work.    [] (30576) Provided verbal/tactile cueing for activities related to improving balance, coordination, kinesthetic sense, posture, motor skill, proprioception  to assist with  scapular, scapulothoracic and UE control with self care, reaching, carrying, lifting, house/yardwork, driving/computer work.  [] (93835) Therapist is in constant attendance Manual (60318) x  1     [] Ultrasound  [x] TA x 1       [] Mech Traction (62348)  [] Aquatic Therapy x      [] ES (un) (25992):   [] Home Management Training x  [] ES(attended) (36312)   [] Dry Needling 1-2 muscles (86598):  [] Dry Needling 3+ muscles (665293  [] Group:      [] Other:                    GOALS:  Patient stated goal: Increase mobility and decrease joint pain  []? Progressing: []? Met: []? Not Met: []? Adjusted     Therapist goals for Patient:   Short Term Goals: To be achieved in: 3 weeks  1. Independent in HEP and progression per patient tolerance, in order to prevent re-injury. []? Progressing: []? Met: []? Not Met: []? Adjusted  2. Patient will have a 25% decrease in pain to facilitate improvement in movement, function, and ADLs as indicated by Functional Deficits. []? Progressing: []? Met: []? Not Met: []? Adjusted     Long Term Goals: To be achieved in: 6 weeks  1. Disability index score of 20% or less for the  ADIN and Quick DASH to assist with reaching prior level of function. []? Progressing: []? Met: []? Not Met: []? Adjusted  2. Patient will demonstrate increased AROM to [facility] of thoracic spine and shoulder elevation without pain arc to allow for pt to bend and stand fully upright and reach overhead without pain in back or shoulders  []? Progressing: []? Met: []? Not Met: []? Adjusted  3. Patient will demonstrate an increase in postural awareness and control and activation of scapular stabilizers to allow for proper functional mobility as indicated by patients Functional Deficits. []? Progressing: []? Met: []? Not Met: []? Adjusted  4. Patient will demonstrate an increase in Strength to grossly 4+/5 for scap stabilizers and deep back muscles to allow for proper functional mobility as indicated by patients Functional Deficits. []? Progressing: []? Met: []? Not Met: []? Adjusted  5.  Patient will return to functional activities including lifting weights overhead without increased symptoms or restriction. []? Progressing: []? Met: []? Not Met: []? Adjusted  6. Pt will report at least 50% less back pain during ADLs, job and workouts  []? Progressing: []? Met: []? Not Met: []? Adjusted            Overall Progression Towards Functional goals/ Treatment Progress Update:  [] Patient is progressing as expected towards functional goals listed. [] Progression is slowed due to complexities/Impairments listed. [] Progression has been slowed due to co-morbidities. [x] Plan just implemented, too soon to assess goals progression <30days   [] Goals require adjustment due to lack of progress  [] Patient is not progressing as expected and requires additional follow up with physician  [] Other    Persisting Functional Limitations/Impairments:  [x]Sitting [x]Standing   []Transfers  [x]Sleeping   [x]Reaching [x]Lifting   [x]ADLs [x]Housework  []Driving [x]Job related tasks  [x]Sports/Recreation []Other:    ASSESSMENT:   AT [facility] 1/17/22:  Pt is a 54 y.o. man that reports generalized chronic joint pain with primary current complaint of mid back pain and shoulder pain. He exhibits a kyphotic TS with poor segmental mobility and some mal-alignment and protracted scaps. He also has tight pecs and anterior humeral glide position of GHJs R>L with weak scapular stabilizers and multifidi. He has tightness and TP's in TS paraspinals. He has decreased ROM in TS and R shoulder with elevation. He does not exhibit any neural tension signs currently. Pt should benefit from skilled PT to decrease pain and improve mobility. Treatment/Activity Tolerance:  [x] Pt able to complete treatment [] Patient limited by fatique  [] Patient limited by pain  [] Patient limited by other medical complications  [] Other:     Prognosis:   [x] Good [] Fair  [] Poor    Patient Requires Follow-up: [x] Yes  [] No    Return to Play:    [x]  N/A    PLAN: See eval. Recommend PT 2x / week for 6 weeks.  Manual therapy and exercises focusing on scap and deep back muscle strengthening. May benefit from K-tape for postural cues  [] Continue per plan of care [] Alter current plan (see comments)  [x] Plan of care initiated [] Hold pending MD visit [] Discharge    Electronically signed by: Maira Hendricks, PT, DPT      Note: If patient does not return for scheduled/ recommended follow up visits, this note will serve as a discharge from care along with most recent update on progress.

## 2022-01-31 ENCOUNTER — HOSPITAL ENCOUNTER (OUTPATIENT)
Dept: PHYSICAL THERAPY | Age: 52
Setting detail: THERAPIES SERIES
Discharge: HOME | End: 2022-01-31
Payer: COMMERCIAL

## 2022-02-01 NOTE — FLOWSHEET NOTE
[address] [facility]    Physical Therapy  Cancellation/No-show Note  Patient Name:  Shaista Lopez  :  1970   Date:  2022    Cancelled visits to date: 2  No-shows to date: 0    For today's appointment patient:  [x]  Cancelled ,   []  Rescheduled appointment   []  No-show     Reason given by patient:  [x]  Patient ill  []  Conflicting appointment  []  No transportation    []  Conflict with work  []  No reason given  []  Other:     Comments:      Phone call information:   []  Phone call made today to patient at _ time at number provided:      []  Patient answered, conversation as follows:    []  Patient did not answer, message left as follows:  []  Phone call not made today  [x]  Phone call not needed - pt contacted us to cancel and provided reason for cancellation. Electronically signed by:   Jerald Nichole PT

## 2022-02-02 ENCOUNTER — HOSPITAL ENCOUNTER (OUTPATIENT)
Dept: PHYSICAL THERAPY | Age: 52
Setting detail: THERAPIES SERIES
Discharge: HOME | End: 2022-02-02
Payer: COMMERCIAL

## 2022-02-02 NOTE — FLOWSHEET NOTE
[address] [facility]    Physical Therapy  Cancellation/No-show Note  Patient Name:  Suleiman Mccord  :  1970   Date:  2022    Cancelled visits to date: 2  No-shows to date: 1  For today's appointment patient:  []  [address] ,   []  Rescheduled appointment   [x]  No-show 2/     Reason given by patient:  [x]  Patient ill  []  Conflicting appointment  []  No transportation    []  Conflict with work  [x]  No reason given  []  Other:     Comments:      Phone call information:   []  Phone call made today to patient at _ time at number provided:      []  Patient answered, conversation as follows:    []  Patient did not answer, message left as follows:  [x]  Phone call not made today  []  Phone call not needed - pt contacted us to cancel and provided reason for cancellation. Electronically signed by:   Avinash Fong, PT

## 2022-02-16 ENCOUNTER — HOSPITAL ENCOUNTER (OUTPATIENT)
Dept: PHYSICAL THERAPY | Age: 52
Setting detail: THERAPIES SERIES
Discharge: HOME | End: 2022-02-16
Payer: COMMERCIAL

## 2022-02-16 PROCEDURE — 97530 THERAPEUTIC ACTIVITIES: CPT

## 2022-02-16 PROCEDURE — 97110 THERAPEUTIC EXERCISES: CPT

## 2022-02-16 NOTE — PROGRESS NOTES
Chel Diop  Phone: [phone]   Fax: [phone]    Physical Therapy Treatment Note/ Progress Report:     Date:  2022    Patient Name:  Shaista Lopez    :  1970  MRN: [id number]  Restrictions/Precautions:    Medical/Treatment Diagnosis Information:  · Diagnosis: M54.9 Mid back pain  · Treatment Diagnosis: Mid back pain/ B shoulder pain R>L; decreased ROM/hypomobility/mal-alignment of TS; ant humeral glide position of GHJ; decreased strength of scap stabilizers; tight pecs  Insurance/Certification information:  PT Insurance Information: [employer]  ([address])  Physician Information:  Referring Practitioner: Nilay Easton MD  Plan of care signed (Y/N): []  Yes  []  No     Date of Patient follow up with Physician: 22     Progress Report: []  Yes  []  No     Date Range for reporting period:  Beginnin22  Endin22    Progress report due (10 Rx/or 30 days whichever is less):      Recertification due (POC duration/ or 90 days whichever is less):      Visit # Insurance Allowable Auth required? Date Range   Eval +  1 30 [x]  Yes  []  No pcy       Units approved Units used/by date Date Range   96 4 by 22- 3/9/22      Latex Allergy:  [x]NO      []YES  Preferred Language for Healthcare:   [x]English       []other:    Functional Scale:        Date assessed:  ADIN: raw score=19; dysfunction=38%   22  Quick DASH: raw score = 27; dysfunction = 36%  22     Pain level:  4/10 mid back; 5-6/10 shoulders (at times pain levels are higher)     SUBJECTIVE:  Pt returns to PT after not having come to any follow ups since the evaluation. He states he's been working 2 jobs and hasn't been able to make it. He goes into a lot of details about all the supplements he takes and foods he eats or shouldn't eat  for his arthritic flare ups.  He c/o joint pains in knees and elbow and his shoulder is bothering him more than his mid back. He couldn't indicate what daily activities or positioning affects his back pain. Lifting dumbbells increases his pain or using too much weight for his workouts. When asked what his goal is for PT he stated that in order to get pain management assessment he has to get imaging but insurance won't cover it unless he goes to PT. When asked if he has been doing the HEP given last session, he said only once or twice because he hasn't had the time. Then pt took a phone call regarding a charge on his credit card and the call lasted 25 minutes!!!     OBJECTIVE: See eval   Observation:    Test measurements:    2/16/22:  Posture: Pt continues to sit in slumped posture w/ fwd shoulders due to very tight pecs, L scap winging    AROM  shoulder elevation R: 137, L: 145 (both painful at end range)   Strength: Mid trap R: 4-, L: 4;  Low trap: R&L: 4-;  L serratus ant: 4-     RESTRICTIONS/PRECAUTIONS:     Exercises/Interventions:   Therapeutic Exercise (31590) x 30' Resistance / level Sets / time  Reps Notes/Cues   UBE       Wall slide W->Y w/ lift off  1/3\" 10    S/L open book     Seated TS flex/ext (against towel roll mid TS)     Mid row Blue TB 1 15    High row Blue TB  CC 5 pl 1  1 15  15 Had pt do with TB and on machine so he can advance these at the gym.  Needs cues to not use UT's   Pec stretch in doorway  30\" 3 Needs cues to not force into pain   scap retraction + ER Blue TB 2 10    Prone over SB HABD  Mid trap lift 1  1 10  10 Cues for correct scap activation   Supine over towel roll across mid TS for scap retraction + trunk extension  1/3\" 10    Supine on half foam roll w/ B GH flexion and HABD    npv   Serratus strengthening    npv                 Therapeutic Activities (44206)       SEE PATIENT EDUCATION BELOW & OBJ MEASURES ABOVE  30'  2/16/22                               Neuromuscular Re-ed (45246)       Prone scap stabs    npv   Q-ped opp UE/LE lift    npv                        Manual Intervention (28959) x        Shld /GH Mobs     Post Cap mobs     Thoracic/Rib manipulation     CT MT/Mobs     STM/ IASTM                Modalities:     Pt. Education:   1/17/22: Educated pt on how posture and strength imbalance is affecting his pain. -pt educated on diagnosis, prognosis and expectations for rehab  -all pt questions were answered  2/16/22: Spent sig time trying to reassess pt for prog note and to keep him on task. Discussed the importance of attending PT and doing his HEP regularly in order to make any improvement. Advised pt to try to get referral to a nutritionist or dietician since he is very focused on how certain foods/supplements affect his pain. Educated pt on how his workouts focusing on his pecs and biceps are causing sig tightness and pulling his spine and GHJ's forward contributing to postural dysfunction and pain. Advised pt to stop overloading the resistance on his exercises when it is causing more pain. Home Exercise Program:  Patient was instructed in the following exercises, performed them in the clinic and was issued a handout:    Access Code: [email or web address]  URL: [email or web address]  Date: 01/17/2022  Prepared by: Maira Hendricks  Exercises  Wall slides W to Y position with lift off - 1 x daily - 7 x weekly - 2 sets - 10 reps  Seated Thoracic Flexion and Extension - 1 x daily - 7 x weekly - 1 sets - 10 reps - 5-10 seconds hold  Sidelying Thoracic Rotation with Open Book - 1 x daily - 7 x weekly - 1-2 sets - 10 reps - 5-10 seconds hold    Access Code: [email or web address]  URL: [email or web address]  Date: 02/16/2022  Prepared by:  Maira Hendricks  Exercises  Standing High Row with Resistance - 1 x daily - 3-4 x weekly - 2-3 sets - 10 reps  Doorway Pec Stretch at 90 Degrees Abduction - 1 x daily - 7 x weekly - 3-4 reps - 20-30 seconds hold  Shoulder External Rotation and Scapular Retraction with Resistance - 1 x daily - 3-4 x weekly - 2-3 sets - 10 reps  Prone Middle Trapezius Strengthening on Swiss Ball with Dumbbells - 1 x daily - 3-4 x weekly - 2-3 sets - 10 reps  Thoracic Extension Mobilization on Foam Roll - 1 x daily - 3-4 x weekly - 1-2 sets - 10 reps         Therapeutic Exercise and NMR EXR  [x] (71938) Provided verbal/tactile cueing for activities related to strengthening, flexibility, endurance, ROM  for improvements in scapular, scapulothoracic and UE control with self care, reaching, carrying, lifting, house/yardwork, driving/computer work.    [] (92462) Provided verbal/tactile cueing for activities related to improving balance, coordination, kinesthetic sense, posture, motor skill, proprioception  to assist with  scapular, scapulothoracic and UE control with self care, reaching, carrying, lifting, house/yardwork, driving/computer work.  [] (22062) Therapist is in constant attendance of 2 or more patients providing skilled therapy interventions, but not providing any significant amount of measurable one-on-one time to either patient, for improvements in cervical, scapular, scapulothoracic and UE control with self care, reaching, carrying, lifting, house/yardwork, driving, computer work. Therapeutic Activities:    [x] (97734 or 72309) Provided verbal/tactile cueing for activities related to improving balance, coordination, kinesthetic sense, posture, motor skill, proprioception and motor activation to allow for proper function of scapular, scapulothoracic and UE control with self care, carrying, lifting, driving/computer work.      Home Exercise Program:    [x] (57506) Reviewed/Progressed HEP activities related to strengthening, flexibility, endurance, ROM of scapular, scapulothoracic and UE control with self care, reaching, carrying, lifting, house/yardwork, driving/computer work  [] (39437) Reviewed/Progressed HEP activities related to improving balance, coordination, kinesthetic sense, posture, motor skill, proprioception of scapular, scapulothoracic and UE control with self care, reaching, Patient will demonstrate increased AROM to [facility] of thoracic spine and shoulder elevation without pain arc to allow for pt to bend and stand fully upright and reach overhead without pain in back or shoulders  []? Progressing: []? Met: [x]? Not Met: []? Adjusted  3. Patient will demonstrate an increase in postural awareness and control and activation of scapular stabilizers to allow for proper functional mobility as indicated by patients Functional Deficits. []? Progressing: []? Met: [x]? Not Met: []? Adjusted  4. Patient will demonstrate an increase in Strength to grossly 4+/5 for scap stabilizers and deep back muscles to allow for proper functional mobility as indicated by patients Functional Deficits. []? Progressing: []? Met: [x]? Not Met: []? Adjusted  5. Patient will return to functional activities including lifting weights overhead without increased symptoms or restriction. []? Progressing: []? Met: [x]? Not Met: []? Adjusted  6. Pt will report at least 50% less back pain during ADLs, job and workouts  []? Progressing: []? Met: [x]? Not Met: []? Adjusted            Overall Progression Towards Functional goals/ Treatment Progress Update:  [] Patient is progressing as expected towards functional goals listed. [] Progression is slowed due to complexities/Impairments listed. [] Progression has been slowed due to co-morbidities.   [] Plan just implemented, too soon to assess goals progression <30days   [] Goals require adjustment due to lack of progress  [] Patient is not progressing as expected and requires additional follow up with physician  [x] Other: Pt has not progressed yet due to not attending PT or doing his HEP    Persisting Functional Limitations/Impairments:  [x]Sitting [x]Standing   []Transfers  [x]Sleeping   [x]Reaching [x]Lifting   [x]ADLs [x]Housework  []Driving [x]Job related tasks  [x]Sports/Recreation []Other:    ASSESSMENT:   AT [facility] 1/17/22:  Pt is a 54 y.o. man that reports generalized chronic joint pain with primary current complaint of mid back pain and shoulder pain. He exhibits a kyphotic TS with poor segmental mobility and some mal-alignment and protracted scaps. He also has tight pecs and anterior humeral glide position of GHJs R>L with weak scapular stabilizers and multifidi. He has tightness and TP's in TS paraspinals. He has decreased ROM in TS and R shoulder with elevation. He does not exhibit any neural tension signs currently. Pt should benefit from skilled PT to decrease pain and improve mobility    2/16/22: Pt is not exhibiting any improvement thus far because he has not attended any follow up PT appts since his eval a month ago and he has not been doing his HEP. It is very difficult to keep patient focused on answering questions without going far off subject and with doing the exercises correctly (he needs tactile and verbal cues for correct exercise technique). He spent half of today's session on the phone despite being told he was using up treatment time. The significant tightness of his pecs and biceps is contributing to his increased TS kyphotic posture and his impingment of his shoulders with elevation. Pt may benefit from skilled PT if he is consistent with attendance and HEP              Treatment/Activity Tolerance:  [x] Pt able to complete treatment [] Patient limited by fatique  [] Patient limited by pain  [] Patient limited by other medical complications  [] Other:     Prognosis:   [x] Good [] Fair  [] Poor    Patient Requires Follow-up: [x] Yes  [] No    Return to Play:    [x]  N/A    PLAN: Will attempt PT sessions 1x/wk and focus on exercises to improve posture and to strengthen scap muscles and multifidi. If time allows may do brief jt mobs to address anterior humeral glide and TS kyphosis  [x] Continue per plan of care [] Alter current plan (see comments)  [] Plan of care initiated [] Hold pending MD visit [] Discharge    Electronically signed by:  Harshad Medina, PT, DPT      Note: If patient does not return for scheduled/ recommended follow up visits, this note will serve as a discharge from care along with most recent update on progress.

## 2022-02-17 ENCOUNTER — TELEPHONE (OUTPATIENT)
Dept: RHEUMATOLOGY | Age: 52
End: 2022-02-17

## 2022-02-17 NOTE — TELEPHONE ENCOUNTER
Has he done PT? He has to do physical therapy first and if his symptoms still do not improve then I will have to order MRI for evaluation.   They will not approve MRI without the physical therapy  Thanks,  Dr. Katlyn Crane

## 2022-02-17 NOTE — TELEPHONE ENCOUNTER
Spoke to patient and he is in awful pain. He wants to go to the ER--he has been doing PT and would love to be able to get the MRI but his pain is so bad he can hardly stand today. What should he do?

## 2022-02-17 NOTE — TELEPHONE ENCOUNTER
Pt called stating that he is having a real hard time with his back. He reports he has flares from time to time. He has ibuprofen 800 mg and flexeril at home. He is wondering if he can get some prednisone? He is having a real hard time driving and functioning. Elly on file.

## 2022-02-22 ENCOUNTER — OFFICE VISIT (OUTPATIENT)
Dept: INTERNAL MEDICINE CLINIC | Age: 52
End: 2022-02-22
Payer: COMMERCIAL

## 2022-02-22 VITALS
HEART RATE: 65 BPM | SYSTOLIC BLOOD PRESSURE: 120 MMHG | WEIGHT: 173 LBS | DIASTOLIC BLOOD PRESSURE: 70 MMHG | BODY MASS INDEX: 24.77 KG/M2 | OXYGEN SATURATION: 99 % | HEIGHT: 70 IN

## 2022-02-22 DIAGNOSIS — F19.11 HISTORY OF DRUG ABUSE (HCC): ICD-10-CM

## 2022-02-22 DIAGNOSIS — R73.9 HYPERGLYCEMIA: ICD-10-CM

## 2022-02-22 DIAGNOSIS — Z00.00 ANNUAL PHYSICAL EXAM: ICD-10-CM

## 2022-02-22 DIAGNOSIS — Z13.220 SCREENING CHOLESTEROL LEVEL: ICD-10-CM

## 2022-02-22 DIAGNOSIS — Z76.89 ENCOUNTER TO ESTABLISH CARE: Primary | ICD-10-CM

## 2022-02-22 DIAGNOSIS — R53.83 FATIGUE, UNSPECIFIED TYPE: ICD-10-CM

## 2022-02-22 DIAGNOSIS — Z12.11 COLON CANCER SCREENING: ICD-10-CM

## 2022-02-22 PROCEDURE — G8484 FLU IMMUNIZE NO ADMIN: HCPCS | Performed by: NURSE PRACTITIONER

## 2022-02-22 PROCEDURE — 99386 PREV VISIT NEW AGE 40-64: CPT | Performed by: NURSE PRACTITIONER

## 2022-02-22 ASSESSMENT — PATIENT HEALTH QUESTIONNAIRE - PHQ9
1. LITTLE INTEREST OR PLEASURE IN DOING THINGS: 0
SUM OF ALL RESPONSES TO PHQ QUESTIONS 1-9: 0
2. FEELING DOWN, DEPRESSED OR HOPELESS: 0
SUM OF ALL RESPONSES TO PHQ QUESTIONS 1-9: 0
SUM OF ALL RESPONSES TO PHQ9 QUESTIONS 1 & 2: 0

## 2022-02-22 NOTE — PROGRESS NOTES
SUBJECTIVE:    Patient ID: Derrell Carter is a 54 y.o. male. CC: New patient appointment    HPI: The patient presents to the office to [address] a new primary care provider. Previous PCP was Cassandra Silver    He is a patient of rheumatology, Dr. Gabriella Bourne. He reports 3-4 years of multiple joint pain. He was diagnosed with OA by rheumatology. He feels he has arthritis flares which are caused by different food or drinks. He is on ibuprofen and muscle relaxant. He has been sent to PT. He doesn't feel it is helping and has missed multiple therapy appointment. History of asthma. He denies any recent exacerbation. Aortic stenosis per his medical records. Hep C treatment with Dr. Rick Hui      He reports quitting using cocaine about 7 months ago. He has been clean from IV drug use for about 6-7 years. He reports quitting tobacco about 7 months ago. Denies any alcohol use.       Past Medical History:   Diagnosis Date    Asthma     COPD (chronic obstructive pulmonary disease) ([facility])         Past Surgical History:   Procedure Laterality Date    TIBIA FRACTURE SURGERY      bilateral    WRIST SURGERY         Family History   Problem Relation Age of Onset    Arthritis Maternal Uncle        Social History     Socioeconomic History    Marital status: Single     Spouse name: Not on file    Number of children: Not on file    Years of education: Not on file    Highest education level: Not on file   Occupational History    Not on file   Tobacco Use    Smoking status: Former Smoker     Types: Cigarettes    Smokeless tobacco: Never Used   Vaping Use    Vaping Use: Never used   Substance and Sexual Activity    Alcohol use: Yes     Comment: occ    Drug use: No    Sexual activity: Not on file   Other Topics Concern    Not on file   Social History Narrative    Not on file     Social Determinants of Health     Financial Resource Strain:     Difficulty of Paying Living Expenses: Not on file   Food Insecurity:     Worried About Running Out of Food in the Last Year: Not on file    Macy of Food in the Last Year: Not on file   Transportation Needs:     Lack of Transportation (Medical): Not on file    Lack of Transportation (Non-Medical): Not on file   Physical Activity:     Days of Exercise per Week: Not on file    Minutes of Exercise per Session: Not on file   Stress:     Feeling of Stress : Not on file   Social Connections:     Frequency of Communication with Friends and Family: Not on file    Frequency of Social Gatherings with Friends and Family: Not on file    Attends [employer] Services: Not on file    Active Member of [address] [place] [address] [employer] or Organizations: Not on file    Attends Club or Organization Meetings: Not on file    Marital Status: Not on file   Intimate Partner Violence:     Fear of Current or Ex-Partner: Not on file    Emotionally Abused: Not on file    Physically Abused: Not on file    Sexually Abused: Not on file   Housing Stability:     Unable to Pay for Housing in the Last Year: Not on file    Number of [place] in the Last Year: Not on file    Unstable Housing in the Last Year: Not on file       Current Outpatient Medications on File Prior to Visit   Medication Sig Dispense Refill    SYMBICORT 80-4.5 MCG/ACT AERO       famotidine (PEPCID) 40 MG tablet       ibuprofen (ADVIL;MOTRIN) 800 MG tablet       metoprolol tartrate (LOPRESSOR) 25 MG tablet       montelukast (SINGULAIR) 10 MG tablet       cyclobenzaprine (FLEXERIL) 5 MG tablet Take 1 or 2 tabs at night. Can take 1 tab in am if tolerated 90 tablet 2    loratadine (CLARITIN) 10 MG tablet  (Patient not taking: Reported on 2/22/2022)      albuterol sulfate  (90 BASE) MCG/ACT inhaler Inhale 2 puffs into the lungs every 6 hours as needed for Wheezing 1 Inhaler 0     No current facility-administered medications on file prior to visit.        Allergies   Allergen Reactions    Morphine     Pcn [Penicillins]     Ultram [Tramadol]             Review of Systems   Constitutional: Negative. Respiratory: Negative. Cardiovascular: Negative. Gastrointestinal: Negative. Genitourinary: Negative. Musculoskeletal: Positive for arthralgias. Neurological: Negative. Psychiatric/Behavioral: Negative. All other systems reviewed and are negative. OBJECTIVE:  Physical Exam  Vitals reviewed. Constitutional:       General: He is not in acute distress. Appearance: He is well-developed. He is not diaphoretic. HENT:      Head: Normocephalic and atraumatic. Eyes:      General: No scleral icterus. Conjunctiva/sclera: Conjunctivae normal.   Neck:      Vascular: No JVD. Cardiovascular:      Rate and Rhythm: Normal rate and regular rhythm. Pulmonary:      Effort: Pulmonary effort is normal. No respiratory distress. Breath sounds: Normal breath sounds. No wheezing or rales. Abdominal:      General: There is no distension. Palpations: Abdomen is soft. Tenderness: There is no abdominal tenderness. There is no guarding or rebound. Musculoskeletal:         General: Normal range of motion. Cervical back: Neck supple. Skin:     General: Skin is warm and dry. Neurological:      Mental Status: He is alert and oriented to person, place, and time. Psychiatric:         Behavior: Behavior normal.         Thought Content: Thought content normal.        /70   Pulse 65   Ht 5' 10\" (1.778 m)   Wt 173 lb (78.5 kg)   SpO2 99%   BMI 24.82 kg/m²        PHQ Scores 2/22/2022   PHQ2 Score 0   PHQ9 Score 0     Interpretation of Total Score DepressionSeverity: 1-4 = Minimal depression, 5-9 = Mild depression, 10-14 = Moderate depression, 15-19 = Moderately severe depression, 20-27 = Severe depression         ASSESSMENT/PLAN:  Chichi Bear was seen today for arthritis and established new doctor. Diagnoses and all orders for this visit:    Encounter to establish care  -     HIV Screen;  Future  - Hemoglobin A1C; Future    Annual physical exam  -     Moustapha Ahn MD, Gastroenterology, [facility]  -     HIV Screen; Future  -     Hemoglobin A1C; Future  -     LIPID PANEL; Future    Hyperglycemia  -     Hemoglobin A1C; Future    Fatigue, unspecified type  -     Testosterone; Future  -     Vitamin D 25 Hydroxy  -     Vitamin B12 & Folate; Future    Colon cancer screening  -     KULWANT - Valdemar Land MD, Gastroenterology, [facility]    Screening cholesterol level  -     LIPID PANEL; Future    History of drug abuse ([facility] [address]  -     DRUG SCREEN MULTI URINE;  Future      -Continue rheumatology care with Dr. Wally Ashford  -Continue pulmonology with [address] cardiology with RAYMON Zuñiga - CNP

## 2022-02-23 ENCOUNTER — HOSPITAL ENCOUNTER (OUTPATIENT)
Dept: PHYSICAL THERAPY | Age: 52
Setting detail: THERAPIES SERIES
Discharge: HOME | End: 2022-02-23
Payer: COMMERCIAL

## 2022-02-23 NOTE — FLOWSHEET NOTE
[address] [facility]    Physical Therapy  Cancellation/No-show Note  Patient Name:  Roberto Carlos Angela  :  1970   Date:  2022    Cancelled visits to date: 3  No-shows to date: 1  For today's appointment patient:  [x]  Cancelled , ,   []  Rescheduled appointment   []  No-show /     Reason given by patient:  []  Patient ill  []  Conflicting appointment  [x]  No transportation - car trouble    []  Conflict with work  []  No reason given  []  Other:     Comments:      Phone call information:   []  Phone call made today to patient at _ time at number provided:      []  Patient answered, conversation as follows:    []  Patient did not answer, message left as follows:  []  Phone call not made today  [x]  Phone call not needed - pt contacted us to cancel and provided reason for cancellation.      Electronically signed by:  Leslie Ferrari PTA

## 2022-03-01 ENCOUNTER — TELEPHONE (OUTPATIENT)
Dept: RHEUMATOLOGY | Age: 52
End: 2022-03-01

## 2022-03-01 RX ORDER — IBUPROFEN 600 MG/1
600 TABLET ORAL EVERY 8 HOURS PRN
Qty: 90 TABLET | Refills: 1 | Status: SHIPPED | OUTPATIENT
Start: 2022-03-01

## 2022-03-01 NOTE — TELEPHONE ENCOUNTER
Patient would like a refill on ibuprofen or an nsaid for his elbow and shoulder pain/flare.   Please call to his local [phone]

## 2022-03-02 ENCOUNTER — HOSPITAL ENCOUNTER (OUTPATIENT)
Dept: PHYSICAL THERAPY | Age: 52
Setting detail: THERAPIES SERIES
Discharge: HOME | End: 2022-03-02

## 2022-03-07 ASSESSMENT — ENCOUNTER SYMPTOMS
GASTROINTESTINAL NEGATIVE: 1
RESPIRATORY NEGATIVE: 1

## 2022-03-09 ENCOUNTER — HOSPITAL ENCOUNTER (OUTPATIENT)
Dept: PHYSICAL THERAPY | Age: 52
Setting detail: THERAPIES SERIES
Discharge: HOME | End: 2022-03-09

## 2022-03-09 NOTE — FLOWSHEET NOTE
[address] [facility]    Physical Therapy  Cancellation/No-show Note  Patient Name:  Ritesh Barros  :  1970   Date:  3/9/2022   Medical/Treatment Diagnosis Information:  · Diagnosis: M54.9 Mid back pain  · Treatment Diagnosis: Mid back pain/ B shoulder pain R>L; decreased ROM/hypomobility/mal-alignment of TS; ant humeral glide position of GHJ; decreased strength of scap stabilizers; tight pecs  Insurance/Certification information:  PT Insurance Information: Ashley Holliday   Physician Information:  Referring Practitioner: Vani Pak MD      Cancelled visits to date: 3  No-shows to date: 3    For today's appointment patient:  []  [facility] , ,   []  Rescheduled appointment   [x]  No-show , 3/2, 3/9     Reason given by patient:  []  Patient ill  []  Conflicting appointment  []  No transportation     []  Conflict with work  [x]  No reason given  []  Other:     Comments:  3/9: since pt once again no showed and has missed 8 of his 10 scheduled PT sessions he is being D/C'd and cannot return to PT unless he get a new referral  Phone call information:   []  Phone call made today to patient atpm_ time at number provided:      []  Patient answered, conversation as follows:    [x]  Patient did not answer, message left as follows: 3/7: Advised pt of missed appt and when his next scheduled appt is and left our phone number with instructions to call if he can't attend and that he will be removed from schedule if he no shows again  [x]  Phone call not made today  []  Phone call not needed - pt contacted us to cancel and provided reason for cancellation. Electronically signed by:   Kellen Lynn, PT

## 2023-10-14 ENCOUNTER — OFFICE VISIT (OUTPATIENT)
Age: 53
End: 2023-10-14

## 2023-10-14 VITALS
OXYGEN SATURATION: 96 % | BODY MASS INDEX: 23.51 KG/M2 | HEART RATE: 56 BPM | WEIGHT: 164.2 LBS | TEMPERATURE: 98.4 F | HEIGHT: 70 IN | DIASTOLIC BLOOD PRESSURE: 71 MMHG | SYSTOLIC BLOOD PRESSURE: 118 MMHG

## 2023-10-14 DIAGNOSIS — J20.9 ACUTE BRONCHITIS, UNSPECIFIED ORGANISM: Primary | ICD-10-CM

## 2023-10-14 RX ORDER — ALBUTEROL SULFATE 90 UG/1
2 AEROSOL, METERED RESPIRATORY (INHALATION) 4 TIMES DAILY PRN
Qty: 18 G | Refills: 0 | Status: SHIPPED | OUTPATIENT
Start: 2023-10-14

## 2023-10-14 RX ORDER — BENZONATATE 100 MG/1
100 CAPSULE ORAL 3 TIMES DAILY PRN
Qty: 30 CAPSULE | Refills: 0 | Status: SHIPPED | OUTPATIENT
Start: 2023-10-14 | End: 2023-10-24

## 2023-10-14 RX ORDER — LEVOFLOXACIN 500 MG/1
500 TABLET, FILM COATED ORAL DAILY
Qty: 10 TABLET | Refills: 0 | Status: SHIPPED | OUTPATIENT
Start: 2023-10-14 | End: 2023-10-24

## 2023-10-14 RX ORDER — PREDNISONE 20 MG/1
20 TABLET ORAL 2 TIMES DAILY
Qty: 10 TABLET | Refills: 0 | Status: SHIPPED | OUTPATIENT
Start: 2023-10-14 | End: 2023-10-19

## 2023-10-14 ASSESSMENT — ENCOUNTER SYMPTOMS
NAUSEA: 0
SHORTNESS OF BREATH: 0
RHINORRHEA: 0
VOMITING: 0
SORE THROAT: 0
COUGH: 1
DIARRHEA: 0
ABDOMINAL PAIN: 0
WHEEZING: 1

## 2023-10-14 NOTE — PROGRESS NOTES
Neurological:  Negative for headaches. Physical Exam  Constitutional:       General: He is not in acute distress. HENT:      Nose: Congestion present. Mouth/Throat:      Mouth: Mucous membranes are moist.      Pharynx: Oropharynx is clear. No oropharyngeal exudate or posterior oropharyngeal erythema. Eyes:      Conjunctiva/sclera: Conjunctivae normal.      Pupils: Pupils are equal, round, and reactive to light. Cardiovascular:      Rate and Rhythm: Normal rate and regular rhythm. Pulmonary:      Effort: Pulmonary effort is normal. No respiratory distress (has a bronchitis cough). Breath sounds: Normal breath sounds. Musculoskeletal:      Cervical back: Neck supple. No rigidity. Right lower leg: No edema. Left lower leg: No edema. Lymphadenopathy:      Cervical: No cervical adenopathy. Skin:     Findings: No rash. Neurological:      General: No focal deficit present. Mental Status: He is alert and oriented to person, place, and time. An electronic signature was used to authenticate this note.     --Carlie Balderas MD

## 2024-01-23 ENCOUNTER — OFFICE VISIT (OUTPATIENT)
Age: 54
End: 2024-01-23

## 2024-01-23 VITALS
TEMPERATURE: 97.4 F | HEART RATE: 53 BPM | OXYGEN SATURATION: 96 % | WEIGHT: 160 LBS | SYSTOLIC BLOOD PRESSURE: 144 MMHG | DIASTOLIC BLOOD PRESSURE: 73 MMHG | BODY MASS INDEX: 22.96 KG/M2

## 2024-01-23 DIAGNOSIS — J20.9 ACUTE BRONCHITIS, UNSPECIFIED ORGANISM: Primary | ICD-10-CM

## 2024-01-23 RX ORDER — PREDNISONE 20 MG/1
20 TABLET ORAL 2 TIMES DAILY
Qty: 10 TABLET | Refills: 0 | Status: SHIPPED | OUTPATIENT
Start: 2024-01-23 | End: 2024-01-28

## 2024-01-23 RX ORDER — LEVOFLOXACIN 500 MG/1
500 TABLET, FILM COATED ORAL DAILY
Qty: 10 TABLET | Refills: 0 | Status: SHIPPED | OUTPATIENT
Start: 2024-01-23 | End: 2024-02-02

## 2024-01-23 RX ORDER — ALBUTEROL SULFATE 90 UG/1
2 AEROSOL, METERED RESPIRATORY (INHALATION) 4 TIMES DAILY PRN
Qty: 18 G | Refills: 0 | Status: SHIPPED | OUTPATIENT
Start: 2024-01-23

## 2024-01-23 RX ORDER — BENZONATATE 100 MG/1
100 CAPSULE ORAL 3 TIMES DAILY PRN
Qty: 30 CAPSULE | Refills: 0 | Status: SHIPPED | OUTPATIENT
Start: 2024-01-23 | End: 2024-02-02

## 2024-01-23 ASSESSMENT — ENCOUNTER SYMPTOMS
ABDOMINAL PAIN: 0
DIARRHEA: 0
SHORTNESS OF BREATH: 0
WHEEZING: 1
RHINORRHEA: 0
HEMOPTYSIS: 0
HEARTBURN: 0
VOMITING: 0
SORE THROAT: 0
SINUS PRESSURE: 1

## 2024-01-23 NOTE — PROGRESS NOTES
Ramon Jaime (:  1970) is a 53 y.o. male,Established patient, here for evaluation of the following chief complaint(s):  Cough (Cough w/ green mucous, body aches. X 2 weeks. ER on 23)      ASSESSMENT/PLAN:  1. Acute bronchitis, unspecified organism    - levoFLOXacin (LEVAQUIN) 500 MG tablet; Take 1 tablet by mouth daily for 10 days  Dispense: 10 tablet; Refill: 0  - predniSONE (DELTASONE) 20 MG tablet; Take 1 tablet by mouth 2 times daily for 5 days  Dispense: 10 tablet; Refill: 0  - albuterol sulfate HFA (VENTOLIN HFA) 108 (90 Base) MCG/ACT inhaler; Inhale 2 puffs into the lungs 4 times daily as needed for Wheezing  Dispense: 18 g; Refill: 0  - benzonatate (TESSALON) 100 MG capsule; Take 1 capsule by mouth 3 times daily as needed for Cough  Dispense: 30 capsule; Refill: 0       No follow-ups on file.    SUBJECTIVE/OBJECTIVE:  Pt stated that he was seen at the ER,not better      History provided by:  Patient  Cough  The current episode started 1 to 4 weeks ago. The problem has been gradually worsening. The cough is Productive of sputum. Associated symptoms include nasal congestion and wheezing. Pertinent negatives include no chest pain, chills, ear pain, fever, headaches, heartburn, hemoptysis, myalgias, postnasal drip, rash, rhinorrhea, sore throat, shortness of breath or sweats.       Vitals:    24 1006   BP: (!) 144/73   Site: Right Upper Arm   Position: Sitting   Cuff Size: Medium Adult   Pulse: 53   Temp: 97.4 °F (36.3 °C)   TempSrc: Oral   SpO2: 96%   Weight: 72.6 kg (160 lb)       Review of Systems   Constitutional:  Negative for activity change, appetite change, chills and fever.   HENT:  Positive for congestion and sinus pressure. Negative for ear pain, postnasal drip, rhinorrhea and sore throat.    Respiratory:  Positive for cough and wheezing. Negative for hemoptysis and shortness of breath.    Cardiovascular:  Negative for chest pain.   Gastrointestinal:  Negative for abdominal pain,

## 2024-02-29 ENCOUNTER — TELEPHONE (OUTPATIENT)
Dept: INTERNAL MEDICINE CLINIC | Age: 54
End: 2024-02-29

## 2024-05-07 ENCOUNTER — OFFICE VISIT (OUTPATIENT)
Age: 54
End: 2024-05-07

## 2024-05-07 VITALS
HEIGHT: 70 IN | DIASTOLIC BLOOD PRESSURE: 70 MMHG | OXYGEN SATURATION: 97 % | HEART RATE: 60 BPM | TEMPERATURE: 98.2 F | WEIGHT: 161 LBS | RESPIRATION RATE: 20 BRPM | BODY MASS INDEX: 23.05 KG/M2 | SYSTOLIC BLOOD PRESSURE: 116 MMHG

## 2024-05-07 DIAGNOSIS — J20.9 ACUTE BRONCHITIS, UNSPECIFIED ORGANISM: ICD-10-CM

## 2024-05-07 DIAGNOSIS — J06.9 UPPER RESPIRATORY TRACT INFECTION, UNSPECIFIED TYPE: Primary | ICD-10-CM

## 2024-05-07 RX ORDER — AZITHROMYCIN 250 MG/1
TABLET, FILM COATED ORAL
Qty: 6 TABLET | Refills: 0 | Status: SHIPPED | OUTPATIENT
Start: 2024-05-07 | End: 2024-05-17

## 2024-05-07 RX ORDER — BROMPHENIRAMINE MALEATE, PSEUDOEPHEDRINE HYDROCHLORIDE, AND DEXTROMETHORPHAN HYDROBROMIDE 2; 30; 10 MG/5ML; MG/5ML; MG/5ML
10 SYRUP ORAL 3 TIMES DAILY PRN
Qty: 240 ML | Refills: 0 | Status: SHIPPED | OUTPATIENT
Start: 2024-05-07

## 2024-05-07 RX ORDER — ALBUTEROL SULFATE 90 UG/1
2 AEROSOL, METERED RESPIRATORY (INHALATION) 4 TIMES DAILY PRN
Qty: 18 G | Refills: 0 | Status: SHIPPED | OUTPATIENT
Start: 2024-05-07

## 2024-05-07 ASSESSMENT — ENCOUNTER SYMPTOMS
HEARTBURN: 0
EYES NEGATIVE: 1
RHINORRHEA: 1
DIARRHEA: 0
CHEST TIGHTNESS: 0
COUGH: 1
GASTROINTESTINAL NEGATIVE: 1
VOMITING: 0
WHEEZING: 1
SORE THROAT: 0
VOICE CHANGE: 0
SHORTNESS OF BREATH: 1
NAUSEA: 0
ABDOMINAL PAIN: 0

## 2024-05-07 NOTE — PATIENT INSTRUCTIONS
Take medications as prescribe  May use nasal saline spray as alternative to Flonase for nasal congestion   Increase fluids and rest  Return to clinic or go to the ER if symptoms does not improve or worsen  Follow up with your primary care provider

## 2024-05-07 NOTE — PROGRESS NOTES
[facility], [facility] ([place])  [facility]  [address] [address]  Dept: [phone]  Dept Fax: [phone]  Loc: [phone]  Ramon Jaime is a 53 y.o. male who presents today for his medical conditions/complaintsas noted below.  Ramon Jaime is c/o of Cough (Pt c/o nasal congestion and cough x 4 days )      HPI:       History provided by:  Patient  Cough  This is a recurrent problem. The current episode started 1 to 4 weeks ago. The cough is Productive of brown sputum. Associated symptoms include a fever, nasal congestion, rhinorrhea, shortness of breath and wheezing. Pertinent negatives include no chest pain, chills, ear congestion, headaches, heartburn, postnasal drip, rash or sore throat. The symptoms are aggravated by lying down, exercise, fumes and cold air (Coughing). He has tried OTC cough suppressant for the symptoms. The treatment provided no relief. His past medical history is significant for asthma and environmental allergies.       Past Medical History:   Diagnosis Date    Asthma     COPD (chronic obstructive pulmonary disease) ([facility])       Past Surgical History:   Procedure Laterality Date    TIBIA FRACTURE SURGERY      bilateral    WRIST SURGERY         Family History   Problem Relation Age of Onset    Arthritis Maternal Uncle        Social History     Tobacco Use    Smoking status: Former     Current packs/day: 0.00     Types: Cigarettes     Quit date: 2023     Years since quittin.9    Smokeless tobacco: Never   Substance Use Topics    Alcohol use: Yes     Comment: occ      Current Outpatient Medications   Medication Sig Dispense Refill    albuterol sulfate HFA (VENTOLIN HFA) 108 (90 Base) MCG/ACT inhaler Inhale 2 puffs into the lungs 4 times daily as needed for Wheezing 18 g 0    albuterol sulfate HFA (VENTOLIN HFA) 108 (90 Base) MCG/ACT inhaler Inhale 2 puffs into the lungs 4 times daily as needed for Wheezing 18 g 0    ibuprofen

## 2024-06-17 DIAGNOSIS — J20.9 ACUTE BRONCHITIS, UNSPECIFIED ORGANISM: ICD-10-CM

## 2024-06-24 RX ORDER — ALBUTEROL SULFATE 90 UG/1
AEROSOL, METERED RESPIRATORY (INHALATION)
Qty: 18 G | Refills: 0 | OUTPATIENT
Start: 2024-06-24

## 2024-09-26 ENCOUNTER — OFFICE VISIT (OUTPATIENT)
Age: 54
End: 2024-09-26

## 2024-09-26 VITALS
HEART RATE: 60 BPM | TEMPERATURE: 97.7 F | HEIGHT: 70 IN | DIASTOLIC BLOOD PRESSURE: 73 MMHG | OXYGEN SATURATION: 98 % | BODY MASS INDEX: 22.54 KG/M2 | SYSTOLIC BLOOD PRESSURE: 132 MMHG | WEIGHT: 157.4 LBS

## 2024-09-26 DIAGNOSIS — J01.10 ACUTE NON-RECURRENT FRONTAL SINUSITIS: ICD-10-CM

## 2024-09-26 DIAGNOSIS — J40 BRONCHITIS: Primary | ICD-10-CM

## 2024-09-26 RX ORDER — BUDESONIDE AND FORMOTEROL FUMARATE DIHYDRATE 80; 4.5 UG/1; UG/1
2 AEROSOL RESPIRATORY (INHALATION) 2 TIMES DAILY
Qty: 10.2 G | Refills: 3 | Status: SHIPPED | OUTPATIENT
Start: 2024-09-26

## 2024-09-26 RX ORDER — GUAIFENESIN 600 MG/1
600 TABLET, EXTENDED RELEASE ORAL 2 TIMES DAILY
Qty: 30 TABLET | Refills: 0 | Status: SHIPPED | OUTPATIENT
Start: 2024-09-26 | End: 2024-10-11

## 2024-09-26 RX ORDER — METHYLPREDNISOLONE 4 MG
TABLET, DOSE PACK ORAL
Qty: 1 KIT | Refills: 0 | Status: SHIPPED | OUTPATIENT
Start: 2024-09-26 | End: 2024-10-02

## 2024-09-26 RX ORDER — BUSPIRONE HYDROCHLORIDE 5 MG/1
TABLET ORAL
COMMUNITY
Start: 2024-09-17

## 2024-09-26 RX ORDER — DICLOFENAC SODIUM 75 MG/1
75 TABLET, DELAYED RELEASE ORAL 2 TIMES DAILY
COMMUNITY
Start: 2024-08-08 | End: 2024-10-07

## 2024-09-26 RX ORDER — BROMPHENIRAMINE MALEATE, PSEUDOEPHEDRINE HYDROCHLORIDE, AND DEXTROMETHORPHAN HYDROBROMIDE 2; 30; 10 MG/5ML; MG/5ML; MG/5ML
5 SYRUP ORAL 4 TIMES DAILY PRN
Qty: 118 ML | Refills: 0 | Status: SHIPPED | OUTPATIENT
Start: 2024-09-26

## 2024-12-12 ENCOUNTER — HOSPITAL ENCOUNTER (EMERGENCY)
Age: 54
Discharge: HOME | End: 2024-12-12
Payer: COMMERCIAL

## 2024-12-12 VITALS
RESPIRATION RATE: 20 BRPM | DIASTOLIC BLOOD PRESSURE: 86 MMHG | HEART RATE: 76 BPM | SYSTOLIC BLOOD PRESSURE: 125 MMHG | WEIGHT: 149 LBS | BODY MASS INDEX: 21.33 KG/M2 | HEIGHT: 70 IN | TEMPERATURE: 97.4 F | OXYGEN SATURATION: 99 %

## 2024-12-12 DIAGNOSIS — Z59.00 HOMELESSNESS: Primary | ICD-10-CM

## 2024-12-12 PROCEDURE — 99282 EMERGENCY DEPT VISIT SF MDM: CPT

## 2024-12-12 SDOH — ECONOMIC STABILITY - HOUSING INSECURITY: HOMELESSNESS UNSPECIFIED: Z59.00

## 2024-12-12 ASSESSMENT — ENCOUNTER SYMPTOMS
NAUSEA: 0
SHORTNESS OF BREATH: 0
CHEST TIGHTNESS: 0
DIARRHEA: 0
VOMITING: 0
ABDOMINAL PAIN: 0

## 2024-12-13 NOTE — VIRTUAL HEALTH
Reason for Cancel: No longer needed.      Ramon Jaime, was evaluated through a synchronous (real-time) audio-video encounter. The patient (and/or guardian if applicable) is aware that this is a billable service, which includes applicable co-pays. This virtual visit was conducted with patient's (and/or legal guardian's) consent. Patient identification was verified, and a caregiver was present when appropriate.  The patient was located at Facility (Appt Department): [facility] EMERGENCY DEPARTMENT  [address] [address]  Loc: [phone]  The provider was located at Home (City/State): [place]  Confirm you are appropriately licensed, registered, or certified to deliver care in the state where the patient is located as indicated above. If you are not or unsure, please re-schedule the visit: Yes, I confirm.   [place] Consult to Tele-Psych  Consult performed by: Leigh Barlow LISW  Consult ordered by: Jaclyn Taylor, APRN - CNP  Reason for consult: psychosis           Total time spent on this encounter: Not billed by time    --GASPER Kuhn on 12/12/2024 at 8:55 PM    An electronic signature was used to authenticate this note.

## 2024-12-13 NOTE — ED PROVIDER NOTES
findings:        Interpretation per the Radiologist below, if available at the time of this note:    No orders to display     No results found.    No results found.    PROCEDURES   Unless otherwise noted below, none     Procedures    CRITICAL CARE TIME (.cctime)   none    PAST MEDICAL HISTORY      has a past medical history of Asthma and COPD (chronic obstructive pulmonary disease) (HCC).     Chronic Conditions affecting Care: homelessness    EMERGENCY DEPARTMENT COURSE and DIFFERENTIAL DIAGNOSIS/MDM:   Vitals:    Vitals:    12/12/24 2054   BP: 125/86   Pulse: 76   Resp: 20   Temp: 97.4 °F (36.3 °C)   TempSrc: Oral   SpO2: 99%   Weight: 67.6 kg (149 lb)   Height: 1.778 m (5' 10\")       Patient was given the following medications:  Medications - No data to display          Is this patient to be included in the SEP-1 Core Measure due to severe sepsis or septic shock?   No   Exclusion criteria - the patient is NOT to be included for SEP-1 Core Measure due to:  Infection is not suspected    CONSULTS: (Who and What was discussed)  none    Social Determinants : Patient is Homeless    Records Reviewed : Outpatient Notes emergency department visit 11/2/2024, arrived through the emergency department triage stating that he is having cognitive issues, he is homeless and needed shelter.    CC/HPI Summary, DDx, ED Course, and Reassessment:     Briefly, this is a 54 year old male who presents to the emergency department today seeking shelter, stating that it is 15 degrees side and he does not have a warm place to stay this evening.  Patient does identify that he drove his car here today from [facility], stayed with his mom last night.   I did asked the patient why he cannot stay with his mom tonight he states \"I guess I could\".  Goes on to ask for \"just a little water and something to eat\".       Patient does make good eye contact.  Appropriately dressed.    Patient is offered a turkey sandwich and a glass of water.  He does have a

## 2024-12-13 NOTE — DISCHARGE INSTRUCTIONS
[facility]  [phone]  [address]    Opens at 8 am tomorrow      OUTPATIENT MENTAL HEALTH  [facility] (update 02/2013)    [facility] [phone]-Mental Health Access Point: All initial referrals for community mental health services must go through [facility]. Also service [facility]    Crisis Intervention  Crisis Hotline:  [phone](CARE)  Adult Mobile Crisis [phone] (through [facility])     [facility]   They have several locations throughout [facility].  Many services offered: counseling, psychiatry, case management, housing assist  [phone]    [facility]   [phone]    [facility]  [phone]) call central registration for appointment, 5 locations available    [facility] (located at [facility])  [phone]    [facility]  [phone]-for general information and all services (M-F 8:30-5)  [phone]- adult outpatient intake      [facility]   [phone]    [facility]   They have many locations throughout the [facility]. Call for office nearest you or office that can offer the first appointment.  [phone]    [facility]  [phone]    [facility]   They have numerous programs including residential, alf house, substance abuse, adolescent and adult.  [phone]          Thank You for Choosing [facility]

## 2024-12-13 NOTE — ED NOTES
Pt left with food and beverage. Pt given paperwork with list of shelters. Pt denies any questions or concerns.

## 2024-12-16 ENCOUNTER — HOSPITAL ENCOUNTER (EMERGENCY)
Age: 54
Discharge: HOME | End: 2024-12-17
Attending: EMERGENCY MEDICINE
Payer: COMMERCIAL

## 2024-12-16 DIAGNOSIS — Z59.00 HOMELESS: ICD-10-CM

## 2024-12-16 DIAGNOSIS — S39.012A BACK STRAIN, INITIAL ENCOUNTER: Primary | ICD-10-CM

## 2024-12-16 PROCEDURE — 99283 EMERGENCY DEPT VISIT LOW MDM: CPT

## 2024-12-16 SDOH — ECONOMIC STABILITY - HOUSING INSECURITY: HOMELESSNESS UNSPECIFIED: Z59.00

## 2024-12-17 VITALS
SYSTOLIC BLOOD PRESSURE: 139 MMHG | BODY MASS INDEX: 21.59 KG/M2 | WEIGHT: 145.8 LBS | RESPIRATION RATE: 14 BRPM | DIASTOLIC BLOOD PRESSURE: 77 MMHG | OXYGEN SATURATION: 98 % | HEART RATE: 59 BPM | HEIGHT: 69 IN | TEMPERATURE: 97.9 F

## 2024-12-17 PROCEDURE — 6370000000 HC RX 637 (ALT 250 FOR IP): Performed by: EMERGENCY MEDICINE

## 2024-12-17 RX ORDER — IBUPROFEN 600 MG/1
600 TABLET, FILM COATED ORAL
Status: COMPLETED | OUTPATIENT
Start: 2024-12-17 | End: 2024-12-17

## 2024-12-17 RX ORDER — LIDOCAINE 50 MG/G
1 PATCH TOPICAL DAILY
Qty: 30 PATCH | Refills: 0 | Status: SHIPPED | OUTPATIENT
Start: 2024-12-17

## 2024-12-17 RX ORDER — IBUPROFEN 600 MG/1
600 TABLET, FILM COATED ORAL EVERY 6 HOURS PRN
Qty: 30 TABLET | Refills: 0 | Status: SHIPPED | OUTPATIENT
Start: 2024-12-17

## 2024-12-17 RX ADMIN — IBUPROFEN 600 MG: 600 TABLET, FILM COATED ORAL at 00:50

## 2024-12-17 ASSESSMENT — PAIN DESCRIPTION - LOCATION
LOCATION: BACK
LOCATION: BACK

## 2024-12-17 ASSESSMENT — PAIN DESCRIPTION - DESCRIPTORS
DESCRIPTORS: ACHING
DESCRIPTORS: ACHING

## 2024-12-17 ASSESSMENT — PAIN DESCRIPTION - PAIN TYPE: TYPE: CHRONIC PAIN

## 2024-12-17 ASSESSMENT — PAIN DESCRIPTION - ORIENTATION
ORIENTATION: RIGHT;LOWER
ORIENTATION: RIGHT;LOWER

## 2024-12-17 ASSESSMENT — PAIN - FUNCTIONAL ASSESSMENT
PAIN_FUNCTIONAL_ASSESSMENT: ACTIVITIES ARE NOT PREVENTED
PAIN_FUNCTIONAL_ASSESSMENT: 0-10

## 2024-12-17 ASSESSMENT — PAIN SCALES - GENERAL
PAINLEVEL_OUTOF10: 4
PAINLEVEL_OUTOF10: 4

## 2024-12-17 ASSESSMENT — PAIN DESCRIPTION - FREQUENCY: FREQUENCY: CONTINUOUS

## 2024-12-17 ASSESSMENT — PAIN DESCRIPTION - ONSET: ONSET: ON-GOING

## 2024-12-17 NOTE — ED NOTES
Discharge and education instructions reviewed. Patient verbalized understanding, teach-back successful. Patient denied questions at this time. No acute distress noted. Patient instructed to follow-up as noted - return to emergency department if symptoms worsen. Patient verbalized understanding. Discharged per EDMD with discharge instructions.

## 2024-12-17 NOTE — ED PROVIDER NOTES
[facility] EMERGENCY DEPARTMENT  EMERGENCY DEPARTMENT ENCOUNTER      Pt Name: Ramon Jaime  MRN: [id number]  Birthdate 1970  Date of evaluation: 12/16/2024  Provider: Kell Bishop MD    CHIEF COMPLAINT       Chief Complaint   Patient presents with   • Back Pain         HISTORY OF PRESENT ILLNESS   (Location/Symptom, Timing/Onset, Context/Setting, Quality, Duration, Modifying Factors, Severity)  Note limiting factors.   Ramon Jaime is a 54 y.o. male who presents to the emergency department because he is homeless.  He was sitting in the waiting room for over an hour and wants asked to leave by security he then noted that he has pain in his right lower back and so wanted to be registered in the emergency department.  He admits to being homeless and states that it is wet outside.  He states that he has some cognitive difficulties.  He states this has been going on for a while and nothing has changed.  He denies abdominal pain.  Denies trauma.  No new weakness, numbness, or tingling.  No bowel or bladder dysfunction.  He states that he was 190 pounds in 2005 and is worried about the weight loss since that time but states that he usually drinks smoothies to lose weight.      This patient is at risk for a communicable infection. Therefore, personal protection equipment consisting of a mask and gloves worn for the exam.     Nursing Notes were reviewed.    REVIEW OF SYSTEMS    (2-9 systems for level 4, 10 or more for level 5)     As per HPI    Except as noted above the remainder of the review of systems was reviewed and negative.       PAST MEDICAL HISTORY     Past Medical History:   Diagnosis Date   • Asthma    • COPD (chronic obstructive pulmonary disease) (HCC)          SURGICAL HISTORY       Past Surgical History:   Procedure Laterality Date   • TIBIA FRACTURE SURGERY      bilateral   • WRIST SURGERY           CURRENT MEDICATIONS       Previous Medications    ALBUTEROL SULFATE HFA (VENTOLIN HFA) 108

## 2025-03-20 ENCOUNTER — OFFICE VISIT (OUTPATIENT)
Age: 55
End: 2025-03-20

## 2025-03-20 VITALS
TEMPERATURE: 97.8 F | DIASTOLIC BLOOD PRESSURE: 70 MMHG | HEIGHT: 69 IN | HEART RATE: 60 BPM | BODY MASS INDEX: 21.48 KG/M2 | WEIGHT: 145 LBS | OXYGEN SATURATION: 99 % | SYSTOLIC BLOOD PRESSURE: 120 MMHG

## 2025-03-20 DIAGNOSIS — Z76.0 ENCOUNTER FOR ISSUE OF REPEAT PRESCRIPTION: Primary | ICD-10-CM

## 2025-03-20 NOTE — PROGRESS NOTES
Ramon Jaime (: 1970) is a 54 y.o. male, Established patient, here for evaluation of the following chief complaint(s):  Medication Refill (Out of Metoprolol)      Metoprolol last filled [facility] in     ASSESSMENT/PLAN:    ICD-10-CM    1. Encounter for issue of repeat prescription  Z76.0           Encounter for issue of repeat prescription  Patient presents requesting a refill for his metoprolol 25 mg.  Patient indicates he has not been on it for a while.  Patient is not an accurate historian and answers questions inappropriately with varying and conflicting answers.  Indicates he was prescribed metoprolol a \"few weeks ago but has run out and was told to take it one half of a tab when he feels like he needs it.\"  Chart review and multiple phone calls to pharmacies show he has not had a filled prescription for metoprolol since .  Patient is normotensive and appears to be in psychosis today.  When asked why he felt he needed to start metoprolol again today he said because its interaction with oxygen tablets that he is taking.  He was seen in the emergency room 2023 with a similar complaint.  All recent encounters have listed metoprolol as a patient stated med with no current active prescriptions.  Patient stated he had an appointment with his cardiologist Dr. Portillo on Monday.  Spoke to nurse at Dr. Portillo's office and she indicated that patient had not been prescribed metoprolol in almost 2 years and that they discouraged refilling it especially considering his blood pressure and pulse today.      The patient tolerated their visit well. The patient and/or the family were informed of the results of any tests, a time was given to answer questions, a plan was proposed and they agreed with plan. Reviewed AVS with treatment instructions and answered questions - pt/family expresses understanding and agreement with the discussed treatment plan and AVS
